# Patient Record
Sex: MALE | Race: WHITE | NOT HISPANIC OR LATINO
[De-identification: names, ages, dates, MRNs, and addresses within clinical notes are randomized per-mention and may not be internally consistent; named-entity substitution may affect disease eponyms.]

---

## 2019-09-26 ENCOUNTER — APPOINTMENT (OUTPATIENT)
Dept: OTOLARYNGOLOGY | Facility: CLINIC | Age: 64
End: 2019-09-26
Payer: COMMERCIAL

## 2019-09-26 VITALS
BODY MASS INDEX: 24.79 KG/M2 | WEIGHT: 183 LBS | DIASTOLIC BLOOD PRESSURE: 80 MMHG | HEIGHT: 72 IN | HEART RATE: 68 BPM | SYSTOLIC BLOOD PRESSURE: 126 MMHG | OXYGEN SATURATION: 98 %

## 2019-09-26 DIAGNOSIS — R68.89 OTHER GENERAL SYMPTOMS AND SIGNS: ICD-10-CM

## 2019-09-26 DIAGNOSIS — Z80.8 FAMILY HISTORY OF MALIGNANT NEOPLASM OF OTHER ORGANS OR SYSTEMS: ICD-10-CM

## 2019-09-26 DIAGNOSIS — K21.9 GASTRO-ESOPHAGEAL REFLUX DISEASE W/OUT ESOPHAGITIS: ICD-10-CM

## 2019-09-26 DIAGNOSIS — Z85.828 PERSONAL HISTORY OF OTHER MALIGNANT NEOPLASM OF SKIN: ICD-10-CM

## 2019-09-26 PROBLEM — Z00.00 ENCOUNTER FOR PREVENTIVE HEALTH EXAMINATION: Status: ACTIVE | Noted: 2019-09-26

## 2019-09-26 PROCEDURE — 99204 OFFICE O/P NEW MOD 45 MIN: CPT | Mod: 25

## 2019-09-26 PROCEDURE — 31575 DIAGNOSTIC LARYNGOSCOPY: CPT

## 2019-09-26 RX ORDER — MULTIVITAMIN
CAPSULE ORAL
Refills: 0 | Status: ACTIVE | COMMUNITY

## 2019-09-26 NOTE — CONSULT LETTER
[Dear  ___] : Dear  [unfilled], [Consult Letter:] : I had the pleasure of evaluating your patient, [unfilled]. [Please see my note below.] : Please see my note below. [Consult Closing:] : Thank you very much for allowing me to participate in the care of this patient.  If you have any questions, please do not hesitate to contact me. [Sincerely,] : Sincerely, [FreeTextEntry2] : Deonte Smith MD [FreeTextEntry3] : Alexander Brooks MD, FACS\par Clinical \par Dept. of Otolaryngology\par [facility]\par  [DrAngel  ___] : Dr. FAGAN

## 2019-09-26 NOTE — PROCEDURE
[Unable to Cooperate with Mirror] : patient unable to cooperate with mirror [Globus] : globus [Topical Lidocaine] : topical lidocaine [Oxymetazoline HCl] : oxymetazoline HCl [Flexible Endoscope] : examined with the flexible endoscope [Serial Number: ___] : Serial Number: [unfilled] [Present] : absent [Normal] : the false vocal folds were pink and regular, the ventricular sulcus was open, the true vocal folds were glistening white, tense and of equal length, mobility, and height [True Vocal Cords Paralysis] : no true vocal cord paralysis [True Vocal Cords Erythematous] : no true vocal cord edema [True Vocal Cords Alarcon's Nodules] : no true vocal cord nodules [Glottis Arytenoid Cartilages] : no arytenoid granulomas [Glottis Arytenoid Cartilages Erythema] : no arytenoid erythema [Interarytenoid Edema] : interarytenoid area edematous

## 2019-09-26 NOTE — HISTORY OF PRESENT ILLNESS
[de-identified] : 65 y/o M with a h/o frequent throat clearing and a globus sensation for the past few months.  Pt also reports a dry throat when he sleeps.  His wife reports that he frequently coughs when he eats.

## 2019-09-26 NOTE — ASSESSMENT
[FreeTextEntry1] : Pt's history and findings are c/w laryngopharyngeal reflux.\par \par Reflux precautions and behavioral modifications were discussed with patient including weight loss, the avoidance of caffeine, alcohol and tobacco ingestion, avoidance of eating within 3 hours of retiring for sleep, and elevation of the head of the bed.\par \par Will defer medications for now.  Pt to consider f/u with his GI MD to discuss as well.  \par

## 2019-09-26 NOTE — REVIEW OF SYSTEMS
[Post Nasal Drip] : post nasal drip [Nasal Congestion] : nasal congestion [Throat Clearing] : throat clearing [Eyes Itch] : itching of the eyes [Cough] : cough [Negative] : Heme/Lymph [Sneezing] : no sneezing [Seasonal Allergies] : no seasonal allergies [Ear Pain] : no ear pain [Ear Itch] : no ear itch [Hearing Loss] : no hearing loss [Vertigo] : no vertigo [Recurrent Ear Infections] : no recurrent ear infections [Ear Drainage] : no ear drainage [Ear Noises] : no ear noises [Lightheadedness] : no lightheadedness [Nose Bleeds] : no nose bleeds [Recurrent Sinus Infections] : no recurrent sinus infections [Problem Snoring] : no snoring problems [Sinus Pain] : no sinus pain [Sinus Pressure] : no sinus pressure [Sense Of Smell Problem] : no sense of smell problem [Discolored Nasal Discharge] : no discolored nasal discharge [Snoring With Pauses] : no snoring with pauses [Hoarseness] : no hoarseness [Throat Pain] : no throat pain [Throat Dryness] : no throat dryness [Throat Itching] : no throat itching [Swelling Neck] : no neck swelling [Swelling Face] : no face swelling [Fever] : no fever [Chills] : no chills [Feeling Poorly] : not feeling poorly [Feeling Tired] : not feeling tired [Recent Weight Gain (___ Lbs)] : no recent weight gain [Recent Weight Loss (___ Lbs)] : no recent weight loss [Eye Pain] : no eye pain [Red  Eyes] : eyes not red [Eyesight Problems] : no eyesight problems [Discharge From Eyes] : no purulent discharge from the eyes [Dry Eyes] : no dryness of the eyes [Heart Rate Is Slow] : the heart rate was not slow [Heart Rate Is Fast] : the heart rate was not fast [Chest Pain] : no chest pain [Palpitations] : no palpitations [Leg Claudication] : no intermittent leg claudication [Lower Ext Edema] : no extremity edema [Shortness Of Breath] : no shortness of breath [Wheezing] : no wheezing [SOB on Exertion] : no shortness of breath during exertion [Orthopnea] : no orthopnea [PND] : no PND [Abdominal Pain] : no abdominal pain [Vomiting] : no vomiting [Constipation] : no constipation [Diarrhea] : no diarrhea [Heartburn] : no heartburn [Melena] : no melena [Dysuria] : no dysuria [Incontinence] : no incontinence [Hesitancy] : no urinary hesitancy [Nocturia] : no nocturia [Genital Lesion] : no genital lesions [Testicular Pain] : no testicular pain [Arthralgias] : no arthralgias [Joint Pain] : no joint pain [Joint Swelling] : no joint swelling [Joint Stiffness] : no joint stiffness [Limb Pain] : no limb pain [Limb Swelling] : no limb swelling [Skin Lesions] : no skin lesions [Skin Wound] : no skin wound [Itching] : no itching [Change In A Mole] : no change in a mole [Dry Skin] : no dry skin [An Unusual Growth] : no unusual growth on the skin [Confused] : no confusion [Convulsions] : no convulsions [Dizziness] : no dizziness [Fainting] : no fainting [Limb Weakness] : no limb weakness [Suicidal] : not suicidal [Difficulty Walking] : no difficulty walking [Sleep Disturbances] : no sleep disturbances [Anxiety] : no anxiety [Depression] : no depression [Change In Personality] : no personality change [Emotional Problems] : no emotional problems [Proptosis] : no proptosis [Hot Flashes] : no hot flashes [Muscle Weakness] : no muscle weakness [Erectile Dysfunction] : no erectile dysfunction [Deepening Of The Voice] : no deepening of the voice [Feelings Of Weakness] : no feelings of weakness [Easy Bleeding] : no tendency for easy bleeding [Easy Bruising] : no tendency for easy bruising [Swollen Glands] : no swollen glands [Swollen Glands In The Neck] : no swollen glands in the neck [FreeTextEntry3] : Watery Eyes

## 2019-11-27 ENCOUNTER — EMERGENCY (EMERGENCY)
Facility: HOSPITAL | Age: 64
LOS: 1 days | Discharge: ANOTHER HOSPITAL | End: 2019-11-27
Attending: EMERGENCY MEDICINE | Admitting: EMERGENCY MEDICINE
Payer: COMMERCIAL

## 2019-11-27 ENCOUNTER — INPATIENT (INPATIENT)
Facility: HOSPITAL | Age: 64
LOS: 1 days | Discharge: HOME | DRG: 301 | End: 2019-11-29
Attending: SURGERY | Admitting: SURGERY
Payer: COMMERCIAL

## 2019-11-27 VITALS
TEMPERATURE: 98 F | SYSTOLIC BLOOD PRESSURE: 151 MMHG | RESPIRATION RATE: 16 BRPM | OXYGEN SATURATION: 97 % | HEART RATE: 74 BPM | DIASTOLIC BLOOD PRESSURE: 75 MMHG

## 2019-11-27 VITALS
TEMPERATURE: 98 F | DIASTOLIC BLOOD PRESSURE: 81 MMHG | RESPIRATION RATE: 16 BRPM | WEIGHT: 179.9 LBS | OXYGEN SATURATION: 98 % | HEART RATE: 101 BPM | HEIGHT: 72 IN | SYSTOLIC BLOOD PRESSURE: 136 MMHG

## 2019-11-27 VITALS
DIASTOLIC BLOOD PRESSURE: 98 MMHG | HEART RATE: 86 BPM | OXYGEN SATURATION: 99 % | TEMPERATURE: 98 F | SYSTOLIC BLOOD PRESSURE: 166 MMHG | RESPIRATION RATE: 20 BRPM

## 2019-11-27 DIAGNOSIS — I77.71 DISSECTION OF CAROTID ARTERY: ICD-10-CM

## 2019-11-27 DIAGNOSIS — Z98.890 OTHER SPECIFIED POSTPROCEDURAL STATES: Chronic | ICD-10-CM

## 2019-11-27 LAB
ALBUMIN SERPL ELPH-MCNC: 3.8 G/DL — SIGNIFICANT CHANGE UP (ref 3.3–5)
ALBUMIN SERPL ELPH-MCNC: 4.5 G/DL — SIGNIFICANT CHANGE UP (ref 3.3–5.2)
ALP SERPL-CCNC: 55 U/L — SIGNIFICANT CHANGE UP (ref 40–120)
ALP SERPL-CCNC: 59 U/L — SIGNIFICANT CHANGE UP (ref 40–120)
ALT FLD-CCNC: 25 U/L — SIGNIFICANT CHANGE UP
ALT FLD-CCNC: 34 U/L — SIGNIFICANT CHANGE UP (ref 12–78)
ANION GAP SERPL CALC-SCNC: 10 MMOL/L — SIGNIFICANT CHANGE UP (ref 5–17)
ANION GAP SERPL CALC-SCNC: 13 MMOL/L — SIGNIFICANT CHANGE UP (ref 5–17)
APTT BLD: 57.9 SEC — HIGH (ref 27.5–36.3)
AST SERPL-CCNC: 19 U/L — SIGNIFICANT CHANGE UP (ref 15–37)
AST SERPL-CCNC: 22 U/L — SIGNIFICANT CHANGE UP
BASOPHILS # BLD AUTO: 0.06 K/UL — SIGNIFICANT CHANGE UP (ref 0–0.2)
BASOPHILS # BLD AUTO: 0.07 K/UL — SIGNIFICANT CHANGE UP (ref 0–0.2)
BASOPHILS NFR BLD AUTO: 0.6 % — SIGNIFICANT CHANGE UP (ref 0–2)
BASOPHILS NFR BLD AUTO: 1 % — SIGNIFICANT CHANGE UP (ref 0–2)
BILIRUB SERPL-MCNC: 0.5 MG/DL — SIGNIFICANT CHANGE UP (ref 0.2–1.2)
BILIRUB SERPL-MCNC: 0.6 MG/DL — SIGNIFICANT CHANGE UP (ref 0.4–2)
BLD GP AB SCN SERPL QL: SIGNIFICANT CHANGE UP
BUN SERPL-MCNC: 11 MG/DL — SIGNIFICANT CHANGE UP (ref 8–20)
BUN SERPL-MCNC: 15 MG/DL — SIGNIFICANT CHANGE UP (ref 7–23)
CALCIUM SERPL-MCNC: 8.7 MG/DL — SIGNIFICANT CHANGE UP (ref 8.5–10.1)
CALCIUM SERPL-MCNC: 9.1 MG/DL — SIGNIFICANT CHANGE UP (ref 8.6–10.2)
CHLORIDE SERPL-SCNC: 104 MMOL/L — SIGNIFICANT CHANGE UP (ref 96–108)
CHLORIDE SERPL-SCNC: 104 MMOL/L — SIGNIFICANT CHANGE UP (ref 98–107)
CO2 SERPL-SCNC: 23 MMOL/L — SIGNIFICANT CHANGE UP (ref 22–31)
CO2 SERPL-SCNC: 24 MMOL/L — SIGNIFICANT CHANGE UP (ref 22–29)
CREAT SERPL-MCNC: 1 MG/DL — SIGNIFICANT CHANGE UP (ref 0.5–1.3)
CREAT SERPL-MCNC: 1.3 MG/DL — SIGNIFICANT CHANGE UP (ref 0.5–1.3)
EOSINOPHIL # BLD AUTO: 0.03 K/UL — SIGNIFICANT CHANGE UP (ref 0–0.5)
EOSINOPHIL # BLD AUTO: 0.06 K/UL — SIGNIFICANT CHANGE UP (ref 0–0.5)
EOSINOPHIL NFR BLD AUTO: 0.4 % — SIGNIFICANT CHANGE UP (ref 0–6)
EOSINOPHIL NFR BLD AUTO: 0.6 % — SIGNIFICANT CHANGE UP (ref 0–6)
GLUCOSE SERPL-MCNC: 103 MG/DL — SIGNIFICANT CHANGE UP (ref 70–115)
GLUCOSE SERPL-MCNC: 235 MG/DL — HIGH (ref 70–99)
HCT VFR BLD CALC: 41.6 % — SIGNIFICANT CHANGE UP (ref 39–50)
HCT VFR BLD CALC: 41.8 % — SIGNIFICANT CHANGE UP (ref 39–50)
HGB BLD-MCNC: 13.8 G/DL — SIGNIFICANT CHANGE UP (ref 13–17)
HGB BLD-MCNC: 14.3 G/DL — SIGNIFICANT CHANGE UP (ref 13–17)
IMM GRANULOCYTES NFR BLD AUTO: 0.3 % — SIGNIFICANT CHANGE UP (ref 0–1.5)
IMM GRANULOCYTES NFR BLD AUTO: 0.3 % — SIGNIFICANT CHANGE UP (ref 0–1.5)
INR BLD: 1.01 RATIO — SIGNIFICANT CHANGE UP (ref 0.88–1.16)
LYMPHOCYTES # BLD AUTO: 2.04 K/UL — SIGNIFICANT CHANGE UP (ref 1–3.3)
LYMPHOCYTES # BLD AUTO: 2.45 K/UL — SIGNIFICANT CHANGE UP (ref 1–3.3)
LYMPHOCYTES # BLD AUTO: 23.5 % — SIGNIFICANT CHANGE UP (ref 13–44)
LYMPHOCYTES # BLD AUTO: 30.3 % — SIGNIFICANT CHANGE UP (ref 13–44)
MCHC RBC-ENTMCNC: 31 PG — SIGNIFICANT CHANGE UP (ref 27–34)
MCHC RBC-ENTMCNC: 31.6 PG — SIGNIFICANT CHANGE UP (ref 27–34)
MCHC RBC-ENTMCNC: 33 GM/DL — SIGNIFICANT CHANGE UP (ref 32–36)
MCHC RBC-ENTMCNC: 34.4 GM/DL — SIGNIFICANT CHANGE UP (ref 32–36)
MCV RBC AUTO: 92 FL — SIGNIFICANT CHANGE UP (ref 80–100)
MCV RBC AUTO: 93.9 FL — SIGNIFICANT CHANGE UP (ref 80–100)
MONOCYTES # BLD AUTO: 0.33 K/UL — SIGNIFICANT CHANGE UP (ref 0–0.9)
MONOCYTES # BLD AUTO: 0.55 K/UL — SIGNIFICANT CHANGE UP (ref 0–0.9)
MONOCYTES NFR BLD AUTO: 4.9 % — SIGNIFICANT CHANGE UP (ref 2–14)
MONOCYTES NFR BLD AUTO: 5.3 % — SIGNIFICANT CHANGE UP (ref 2–14)
NEUTROPHILS # BLD AUTO: 4.25 K/UL — SIGNIFICANT CHANGE UP (ref 1.8–7.4)
NEUTROPHILS # BLD AUTO: 7.29 K/UL — SIGNIFICANT CHANGE UP (ref 1.8–7.4)
NEUTROPHILS NFR BLD AUTO: 63.1 % — SIGNIFICANT CHANGE UP (ref 43–77)
NEUTROPHILS NFR BLD AUTO: 69.7 % — SIGNIFICANT CHANGE UP (ref 43–77)
NRBC # BLD: 0 /100 WBCS — SIGNIFICANT CHANGE UP (ref 0–0)
PLATELET # BLD AUTO: 326 K/UL — SIGNIFICANT CHANGE UP (ref 150–400)
PLATELET # BLD AUTO: 329 K/UL — SIGNIFICANT CHANGE UP (ref 150–400)
POTASSIUM SERPL-MCNC: 3.7 MMOL/L — SIGNIFICANT CHANGE UP (ref 3.5–5.3)
POTASSIUM SERPL-MCNC: 3.8 MMOL/L — SIGNIFICANT CHANGE UP (ref 3.5–5.3)
POTASSIUM SERPL-SCNC: 3.7 MMOL/L — SIGNIFICANT CHANGE UP (ref 3.5–5.3)
POTASSIUM SERPL-SCNC: 3.8 MMOL/L — SIGNIFICANT CHANGE UP (ref 3.5–5.3)
PROT SERPL-MCNC: 7.2 G/DL — SIGNIFICANT CHANGE UP (ref 6.6–8.7)
PROT SERPL-MCNC: 7.3 G/DL — SIGNIFICANT CHANGE UP (ref 6–8.3)
PROTHROM AB SERPL-ACNC: 11.6 SEC — SIGNIFICANT CHANGE UP (ref 10–12.9)
RBC # BLD: 4.45 M/UL — SIGNIFICANT CHANGE UP (ref 4.2–5.8)
RBC # BLD: 4.52 M/UL — SIGNIFICANT CHANGE UP (ref 4.2–5.8)
RBC # FLD: 11.9 % — SIGNIFICANT CHANGE UP (ref 10.3–14.5)
RBC # FLD: 12.2 % — SIGNIFICANT CHANGE UP (ref 10.3–14.5)
SODIUM SERPL-SCNC: 137 MMOL/L — SIGNIFICANT CHANGE UP (ref 135–145)
SODIUM SERPL-SCNC: 141 MMOL/L — SIGNIFICANT CHANGE UP (ref 135–145)
TROPONIN I SERPL-MCNC: <.015 NG/ML — SIGNIFICANT CHANGE UP (ref 0.01–0.04)
WBC # BLD: 10.44 K/UL — SIGNIFICANT CHANGE UP (ref 3.8–10.5)
WBC # BLD: 6.74 K/UL — SIGNIFICANT CHANGE UP (ref 3.8–10.5)
WBC # FLD AUTO: 10.44 K/UL — SIGNIFICANT CHANGE UP (ref 3.8–10.5)
WBC # FLD AUTO: 6.74 K/UL — SIGNIFICANT CHANGE UP (ref 3.8–10.5)

## 2019-11-27 PROCEDURE — 93010 ELECTROCARDIOGRAM REPORT: CPT

## 2019-11-27 PROCEDURE — 99285 EMERGENCY DEPT VISIT HI MDM: CPT

## 2019-11-27 PROCEDURE — 80053 COMPREHEN METABOLIC PANEL: CPT

## 2019-11-27 PROCEDURE — 93880 EXTRACRANIAL BILAT STUDY: CPT | Mod: 26

## 2019-11-27 PROCEDURE — 99285 EMERGENCY DEPT VISIT HI MDM: CPT | Mod: 25

## 2019-11-27 PROCEDURE — 70450 CT HEAD/BRAIN W/O DYE: CPT | Mod: 26,59

## 2019-11-27 PROCEDURE — 70496 CT ANGIOGRAPHY HEAD: CPT | Mod: 26

## 2019-11-27 PROCEDURE — 85027 COMPLETE CBC AUTOMATED: CPT

## 2019-11-27 PROCEDURE — 70496 CT ANGIOGRAPHY HEAD: CPT

## 2019-11-27 PROCEDURE — 84484 ASSAY OF TROPONIN QUANT: CPT

## 2019-11-27 PROCEDURE — 36415 COLL VENOUS BLD VENIPUNCTURE: CPT

## 2019-11-27 PROCEDURE — 70498 CT ANGIOGRAPHY NECK: CPT

## 2019-11-27 PROCEDURE — 70498 CT ANGIOGRAPHY NECK: CPT | Mod: 26

## 2019-11-27 PROCEDURE — 70450 CT HEAD/BRAIN W/O DYE: CPT

## 2019-11-27 RX ORDER — LISINOPRIL 2.5 MG/1
1 TABLET ORAL
Qty: 0 | Refills: 0 | DISCHARGE

## 2019-11-27 RX ORDER — SODIUM CHLORIDE 9 MG/ML
1000 INJECTION INTRAMUSCULAR; INTRAVENOUS; SUBCUTANEOUS ONCE
Refills: 0 | Status: COMPLETED | OUTPATIENT
Start: 2019-11-27 | End: 2019-11-27

## 2019-11-27 RX ORDER — HEPARIN SODIUM 5000 [USP'U]/ML
5000 INJECTION INTRAVENOUS; SUBCUTANEOUS ONCE
Refills: 0 | Status: COMPLETED | OUTPATIENT
Start: 2019-11-27 | End: 2019-11-27

## 2019-11-27 RX ORDER — LABETALOL HCL 100 MG
5 TABLET ORAL ONCE
Refills: 0 | Status: COMPLETED | OUTPATIENT
Start: 2019-11-27 | End: 2019-11-27

## 2019-11-27 RX ORDER — HEPARIN SODIUM 5000 [USP'U]/ML
INJECTION INTRAVENOUS; SUBCUTANEOUS
Qty: 25000 | Refills: 0 | Status: DISCONTINUED | OUTPATIENT
Start: 2019-11-27 | End: 2019-11-29

## 2019-11-27 RX ORDER — HEPARIN SODIUM 5000 [USP'U]/ML
INJECTION INTRAVENOUS; SUBCUTANEOUS
Qty: 25000 | Refills: 0 | Status: DISCONTINUED | OUTPATIENT
Start: 2019-11-27 | End: 2019-11-27

## 2019-11-27 RX ORDER — LISINOPRIL 2.5 MG/1
10 TABLET ORAL DAILY
Refills: 0 | Status: DISCONTINUED | OUTPATIENT
Start: 2019-11-27 | End: 2019-11-29

## 2019-11-27 RX ADMIN — SODIUM CHLORIDE 1000 MILLILITER(S): 9 INJECTION INTRAMUSCULAR; INTRAVENOUS; SUBCUTANEOUS at 13:15

## 2019-11-27 RX ADMIN — LISINOPRIL 10 MILLIGRAM(S): 2.5 TABLET ORAL at 22:19

## 2019-11-27 RX ADMIN — HEPARIN SODIUM 5000 UNIT(S): 5000 INJECTION INTRAVENOUS; SUBCUTANEOUS at 18:25

## 2019-11-27 RX ADMIN — SODIUM CHLORIDE 1000 MILLILITER(S): 9 INJECTION INTRAMUSCULAR; INTRAVENOUS; SUBCUTANEOUS at 14:20

## 2019-11-27 RX ADMIN — Medication 5 MILLIGRAM(S): at 20:50

## 2019-11-27 RX ADMIN — HEPARIN SODIUM 1500 UNIT(S)/HR: 5000 INJECTION INTRAVENOUS; SUBCUTANEOUS at 22:23

## 2019-11-27 NOTE — H&P ADULT - ASSESSMENT
63yo male with right internal carotid dissection.   - Admit to Vascualr under Dr. Biggs  - Heparin gtt  - Carotid Duplex  - Reg diet

## 2019-11-27 NOTE — H&P ADULT - NSICDXPASTSURGICALHX_GEN_ALL_CORE_FT
PAST SURGICAL HISTORY:  H/O local excision of skin lesion for basal and squamous cell carcinomas of skin

## 2019-11-27 NOTE — ED PROVIDER NOTE - ATTENDING CONTRIBUTION TO CARE
Pt is a 65 yo male who presents to the ED with a cc of intermittent lightheadedness since 11/17.  He reports that on that day he had been experiencing some nausea and abdominal discomfort.  He had a large loose bowel movement that day x 1 with resolution of symptoms.  Pt reports that following day he felt better and he thought that he was improving.  Early last week he went back to the gym.  He then reports that while at the gym and lifting on Thursday he again developed lightheadedness and felt like his left arm was weak.  This episode lasted approx 2-3 min and then resolved.  During this he noted a pulsing sensation in the back of his head.  he became concerned and followed up with his cardiologist on Friday and had blood work preformed.  He was told that his blood pressure was elevated and was started on Lisinopril.  Pt also reports that he was experiencing ear discomfort.  At that visit he was told that he had dry ears and was prescribed steroid ear drops.  Since Friday he reports that the episodes of lightheadedness have increased in frequency and now occur at rest.  He followed up for ECHO today and upon returning home he reports he became lightheaded with weakness and tingling in bilateral UE.  He also notes that he was hyperventilating at this time.  Denies fever, chills, N/V, CP, abd pain.  Pt also reports that earlier this week he noted a floater in his left eye not currently present.  At this time pt denies all complaints.  Denies fever, chills, N/V, CP, SOB, abd pain, ext numbness or weakness at this time.  Will obtain screening labs, EKG, orthostatics, CT head, CTA head/neck.  Will monitor pt Pt is a 65 yo male who presents to the ED with a cc of intermittent lightheadedness since 11/17.  He reports that on that day he had been experiencing some nausea and abdominal discomfort.  He had a large loose bowel movement that day x 1 with resolution of symptoms.  Pt reports that following day he felt better and he thought that he was improving.  Early last week he went back to the gym.  He then reports that while at the gym and lifting on Thursday he again developed lightheadedness and felt like his left arm was weak.  This episode lasted approx 2-3 min and then resolved.  During this he noted a pulsing sensation in the back of his head.  he became concerned and followed up with his cardiologist on Friday and had blood work preformed.  He was told that his blood pressure was elevated and was started on Lisinopril.  Pt also reports that he was experiencing ear discomfort.  At that visit he was told that he had dry ears and was prescribed steroid ear drops.  Since Friday he reports that the episodes of lightheadedness have increased in frequency and now occur at rest.  He followed up for ECHO today and upon returning home he reports he became lightheaded with weakness and tingling in bilateral UE.  He also notes that he was hyperventilating at this time.  Denies fever, chills, N/V, CP, abd pain.  Pt also reports that earlier this week he noted a floater in his left eye not currently present.  At this time pt denies all complaints.  Denies fever, chills, N/V, CP, SOB, abd pain, ext numbness or weakness at this time. On exam pt lying in bed NAD, NCAT, PERRL, EOMI, heart RRR, lungs CTA, abd soft NT/ND.  CN II-XII intact with no focal deficits at this time.  Pt is ambulatory with no ataxia.  Will obtain screening labs, EKG, orthostatics, CT head, CTA head/neck.  Will monitor pt.  Agree with above plan of care Pt is a 63 yo male who presents to the ED with a cc of intermittent lightheadedness since 11/17.  He reports that on that day he had been experiencing some nausea and abdominal discomfort.  He had a large loose bowel movement that day x 1 with resolution of symptoms.  Pt reports that following day he felt better and he thought that he was improving.  Early last week he went back to the gym.  He then reports that while at the gym and lifting on Thursday he again developed lightheadedness and felt like his left arm was weak.  This episode lasted approx 2-3 min and then resolved.  During this he noted a pulsing sensation in the back of his head.  he became concerned and followed up with his cardiologist on Friday and had blood work preformed.  He was told that his blood pressure was elevated and was started on Lisinopril.  Pt also reports that he was experiencing ear discomfort.  At that visit he was told that he had dry ears and was prescribed steroid ear drops.  Since Friday he reports that the episodes of lightheadedness have increased in frequency and now occur at rest.  He followed up for ECHO today and upon returning home he reports he became lightheaded with weakness and tingling in bilateral UE.  He also notes that he was hyperventilating at this time.  Denies fever, chills, N/V, CP, abd pain.  Pt also reports that earlier this week he noted a floater in his left eye not currently present.  At this time pt denies all complaints.  Denies fever, chills, N/V, CP, SOB, abd pain, ext numbness or weakness at this time. On exam pt lying in bed NAD, NCAT, PERRL, EOMI, heart RRR, lungs CTA, abd soft NT/ND.  CN II-XII intact with no focal deficits at this time.  Pt is ambulatory with no ataxia.  Will obtain screening labs, EKG, orthostatics, CT head, CTA head/neck.  Will monitor pt.  Agree with above plan of care  Of note pt is not a candidate for TPA as NIH stroke scale is currently 0 and pt is outside of window  Currently NPO at this time

## 2019-11-27 NOTE — ED PROVIDER NOTE - OBJECTIVE STATEMENT
64 year old male with no reported Hx presenting to the ED c/o dizziness that started this morning. Patient states he went to [place] this morning and they did a CT scan showing a carotid dissection with plaque. Pt also reports having high blood pressure recently. Pt also reports he had dizziness 6 days ago after working out t the gym. Denies any LOC. Pt denies fevers/chills, ha, loc, focal neuro deficits, cp/sob/palp, cough, abd pain/n/v/d, urinary symptoms, recent travel and sick contacts. 64 year old male with no reported Hx presenting to the ED c/o dizziness that started this morning. Patient states he went to [place] this morning and they did a CT scan showing a carotid dissection with plaque. As per patient, was given 5000 units of Heparin at 5:41pm. Pt also reports having high blood pressure recently. Pt also reports he had dizziness 6 days ago after working out at the gym. Denies any LOC. Pt denies fevers/chills, ha, loc, focal neuro deficits, cp/sob/palp, cough, abd pain/n/v/d, urinary symptoms, recent travel and sick contacts. 64 year old male with no reported Hx presenting to the ED c/o dizziness. Patient states he went to [place] this morning and they did a CT scan showing a carotid dissection. As per patient, was given 5000 units of Heparin at 5:41pm. Pt also reports having high blood pressure recently. Pt also reports he had dizziness - lightheaded not room spinning - 6 days ago after working out at the gym. Denies any LOC. Pt denies fevers/chills, ha, loc, focal neuro deficits, cp/sob/palp, cough, abd pain/n/v/d, urinary symptoms, recent travel and sick contacts.

## 2019-11-27 NOTE — ED ADULT NURSE NOTE - NSIMPLEMENTINTERV_GEN_ALL_ED
Implemented All Universal Safety Interventions:  [place] to call system. Call bell, personal items and telephone within reach. Instruct patient to call for assistance. Room bathroom lighting operational. Non-slip footwear when patient is off stretcher. Physically safe environment: no spills, clutter or unnecessary equipment. Stretcher in lowest position, wheels locked, appropriate side rails in place. Implemented All Fall Risk Interventions:  [place] to call system. Call bell, personal items and telephone within reach. Instruct patient to call for assistance. Room bathroom lighting operational. Non-slip footwear when patient is off stretcher. Physically safe environment: no spills, clutter or unnecessary equipment. Stretcher in lowest position, wheels locked, appropriate side rails in place. Provide visual cue, wrist band, yellow gown, etc. Monitor gait and stability. Monitor for mental status changes and reorient to person, place, and time. Review medications for side effects contributing to fall risk. Reinforce activity limits and safety measures with patient and family.

## 2019-11-27 NOTE — ED PROVIDER NOTE - OBJECTIVE STATEMENT
Pt is a 63 yo female with pmhx of htn recently placed on Lisinopril c/o of intermittent dizziness described as lightheadedness for one week. Pt states today he was sitting became lightheaded blurry vision and became anxious with tingling in both and hand feet.  Pt states that last week shortly after getting over a "virus" he went to the gym and began to feel dizzy.  Pt was subsequently seen by MD, had normal blood work and today had cardiac echo performed.  Pt due to have head CT.  Pt denies chest or back pain, denies N/ falls abdominal pain dysuria fever neck pain. Pt also on steroid drops for ears due to dry ears.

## 2019-11-27 NOTE — H&P ADULT - NSHPPHYSICALEXAM_GEN_ALL_CORE
PHYSICAL EXAM:  GENERAL: NAD, lying in bed comfortably  EYES: EOMI, PERRLA  CHEST/LUNG: Clear to auscultation bilaterally  HEART: Regular rate and rhythm  ABDOMEN: Bowel sounds present; Soft, Nontender, Nondistended. No hepatomegally  EXTREMITIES:  2+ Peripheral Pulses, brisk capillary refill  NERVOUS SYSTEM:  Alert & Oriented X3, motor and sensory intact, CN's intact, strength 5/5 throughout  MSK: FROM all 4 extremities, full and equal strength  VASCULAR: +2 central and distal pulses

## 2019-11-27 NOTE — ED ADULT NURSE NOTE - ED STAT RN HANDOFF DETAILS
Report given to Eli Bro RN. Plan of care explained. Verbalized understanding. Patient transported to ultrasound. Will return to CDU 2L.

## 2019-11-27 NOTE — ED PROVIDER NOTE - PROGRESS NOTE DETAILS
labs wnl ct head neck wnl cta + possible right internal carotid dissection transfer to [place] initiated Dr. Biggs advised heparin will transfer accepting er dr. Landon

## 2019-11-27 NOTE — ED PROVIDER NOTE - CLINICAL SUMMARY MEDICAL DECISION MAKING FREE TEXT BOX
64 year old male with no reported Hx presenting to the ED c/o dizziness that started this morning. 64 year old male with no reported Hx presenting to the ED c/o dizziness pt transferred from [place] for vascular surgery - admit to vascular surgery for further management

## 2019-11-27 NOTE — H&P ADULT - HISTORY OF PRESENT ILLNESS
63yo male transfer from [facility] due to findings of R internal carotid artery dissection. Patient initially presented complaining of lightheaded and dizziness for the past week, also experienced while working out very mild LUE and LLE weakness. Currently denies chest pain, SOB, nausea, vomiting, fevers or chills.

## 2019-11-27 NOTE — ED ADULT NURSE NOTE - OBJECTIVE STATEMENT
Patient is a 64 year old male, A&Ox3 in no apparent distress. c/o medical evaluation for carotid dissection. Patient states today he started to feel dizzy and wobbly. Wife brought patient to [facility]. Got a CT done that showed a right carotid dissection with plaque. Patient states he started to feel dizzy on Thursday after working out. Patient states "I just felt unstable on my feet". Denies LOC, pain, N/V. Patient states he has had diarrhea for a week and a half. No cardiac history. Patient states "it just feels tight in my chest". Patient placed on cardiac monitor. Respirations even, spontaneous, and unlabored. MD Clayton at bedside. Family at bedside.

## 2019-11-27 NOTE — ED ADULT NURSE NOTE - OBJECTIVE STATEMENT
Pt A&Ox4, ambulatory to ED c/o dizziness.  Pt is extremely anxious, states his hands are "tingling."  Pt states that last week shortly after getting over a "virus" he went to the gym and began to feel dizzy.  Pt was subsequently seen by MD, had "normal" bloodwork and today had cardiac echo performed.  Pt due to have head CT (not yet scheduled).  Today, pt states he began feeling very dizzy again with "weakness and tingling" in his hands and arms.  Pt denies chest or back pain, denies N/V.

## 2019-11-27 NOTE — ED ADULT NURSE NOTE - CHPI ED NUR SYMPTOMS NEG
no congestion/no shortness of breath/no nausea/no syncope/no vomiting/no diaphoresis/no fever/no back pain/no chest pain/no chills

## 2019-11-28 LAB
ANION GAP SERPL CALC-SCNC: 12 MMOL/L — SIGNIFICANT CHANGE UP (ref 5–17)
APTT BLD: 60.6 SEC — HIGH (ref 27.5–36.3)
APTT BLD: 65.7 SEC — HIGH (ref 27.5–36.3)
BASOPHILS # BLD AUTO: 0.07 K/UL — SIGNIFICANT CHANGE UP (ref 0–0.2)
BASOPHILS NFR BLD AUTO: 0.8 % — SIGNIFICANT CHANGE UP (ref 0–2)
BUN SERPL-MCNC: 9 MG/DL — SIGNIFICANT CHANGE UP (ref 8–20)
CALCIUM SERPL-MCNC: 8.9 MG/DL — SIGNIFICANT CHANGE UP (ref 8.6–10.2)
CHLORIDE SERPL-SCNC: 103 MMOL/L — SIGNIFICANT CHANGE UP (ref 98–107)
CO2 SERPL-SCNC: 24 MMOL/L — SIGNIFICANT CHANGE UP (ref 22–29)
CREAT SERPL-MCNC: 0.88 MG/DL — SIGNIFICANT CHANGE UP (ref 0.5–1.3)
EOSINOPHIL # BLD AUTO: 0.09 K/UL — SIGNIFICANT CHANGE UP (ref 0–0.5)
EOSINOPHIL NFR BLD AUTO: 1.1 % — SIGNIFICANT CHANGE UP (ref 0–6)
GLUCOSE SERPL-MCNC: 99 MG/DL — SIGNIFICANT CHANGE UP (ref 70–115)
HCT VFR BLD CALC: 40.7 % — SIGNIFICANT CHANGE UP (ref 39–50)
HCV AB S/CO SERPL IA: 0.17 S/CO — SIGNIFICANT CHANGE UP (ref 0–0.99)
HCV AB SERPL-IMP: SIGNIFICANT CHANGE UP
HGB BLD-MCNC: 13.7 G/DL — SIGNIFICANT CHANGE UP (ref 13–17)
IMM GRANULOCYTES NFR BLD AUTO: 0.4 % — SIGNIFICANT CHANGE UP (ref 0–1.5)
INR BLD: 1.03 RATIO — SIGNIFICANT CHANGE UP (ref 0.88–1.16)
LYMPHOCYTES # BLD AUTO: 2.42 K/UL — SIGNIFICANT CHANGE UP (ref 1–3.3)
LYMPHOCYTES # BLD AUTO: 28.9 % — SIGNIFICANT CHANGE UP (ref 13–44)
MAGNESIUM SERPL-MCNC: 2.2 MG/DL — SIGNIFICANT CHANGE UP (ref 1.6–2.6)
MCHC RBC-ENTMCNC: 31.6 PG — SIGNIFICANT CHANGE UP (ref 27–34)
MCHC RBC-ENTMCNC: 33.7 GM/DL — SIGNIFICANT CHANGE UP (ref 32–36)
MCV RBC AUTO: 94 FL — SIGNIFICANT CHANGE UP (ref 80–100)
MONOCYTES # BLD AUTO: 0.52 K/UL — SIGNIFICANT CHANGE UP (ref 0–0.9)
MONOCYTES NFR BLD AUTO: 6.2 % — SIGNIFICANT CHANGE UP (ref 2–14)
NEUTROPHILS # BLD AUTO: 5.23 K/UL — SIGNIFICANT CHANGE UP (ref 1.8–7.4)
NEUTROPHILS NFR BLD AUTO: 62.6 % — SIGNIFICANT CHANGE UP (ref 43–77)
PHOSPHATE SERPL-MCNC: 4 MG/DL — SIGNIFICANT CHANGE UP (ref 2.4–4.7)
PLATELET # BLD AUTO: 281 K/UL — SIGNIFICANT CHANGE UP (ref 150–400)
POTASSIUM SERPL-MCNC: 4.1 MMOL/L — SIGNIFICANT CHANGE UP (ref 3.5–5.3)
POTASSIUM SERPL-SCNC: 4.1 MMOL/L — SIGNIFICANT CHANGE UP (ref 3.5–5.3)
PROTHROM AB SERPL-ACNC: 11.9 SEC — SIGNIFICANT CHANGE UP (ref 10–12.9)
RBC # BLD: 4.33 M/UL — SIGNIFICANT CHANGE UP (ref 4.2–5.8)
RBC # FLD: 11.9 % — SIGNIFICANT CHANGE UP (ref 10.3–14.5)
SODIUM SERPL-SCNC: 139 MMOL/L — SIGNIFICANT CHANGE UP (ref 135–145)
WBC # BLD: 8.36 K/UL — SIGNIFICANT CHANGE UP (ref 3.8–10.5)
WBC # FLD AUTO: 8.36 K/UL — SIGNIFICANT CHANGE UP (ref 3.8–10.5)

## 2019-11-28 RX ORDER — ACETAMINOPHEN 500 MG
650 TABLET ORAL EVERY 6 HOURS
Refills: 0 | Status: DISCONTINUED | OUTPATIENT
Start: 2019-11-28 | End: 2019-11-29

## 2019-11-28 RX ADMIN — Medication 650 MILLIGRAM(S): at 21:19

## 2019-11-28 RX ADMIN — Medication 650 MILLIGRAM(S): at 22:15

## 2019-11-28 RX ADMIN — HEPARIN SODIUM 1500 UNIT(S)/HR: 5000 INJECTION INTRAVENOUS; SUBCUTANEOUS at 14:59

## 2019-11-28 RX ADMIN — HEPARIN SODIUM 1500 UNIT(S)/HR: 5000 INJECTION INTRAVENOUS; SUBCUTANEOUS at 05:49

## 2019-11-29 ENCOUNTER — TRANSCRIPTION ENCOUNTER (OUTPATIENT)
Age: 64
End: 2019-11-29

## 2019-11-29 VITALS — SYSTOLIC BLOOD PRESSURE: 123 MMHG | HEART RATE: 64 BPM | DIASTOLIC BLOOD PRESSURE: 80 MMHG | TEMPERATURE: 97 F

## 2019-11-29 LAB
APTT BLD: 78.5 SEC — HIGH (ref 27.5–36.3)
HCT VFR BLD CALC: 41.7 % — SIGNIFICANT CHANGE UP (ref 39–50)
HGB BLD-MCNC: 14.2 G/DL — SIGNIFICANT CHANGE UP (ref 13–17)
MCHC RBC-ENTMCNC: 31.9 PG — SIGNIFICANT CHANGE UP (ref 27–34)
MCHC RBC-ENTMCNC: 34.1 GM/DL — SIGNIFICANT CHANGE UP (ref 32–36)
MCV RBC AUTO: 93.7 FL — SIGNIFICANT CHANGE UP (ref 80–100)
PLATELET # BLD AUTO: 268 K/UL — SIGNIFICANT CHANGE UP (ref 150–400)
RBC # BLD: 4.45 M/UL — SIGNIFICANT CHANGE UP (ref 4.2–5.8)
RBC # FLD: 11.8 % — SIGNIFICANT CHANGE UP (ref 10.3–14.5)
WBC # BLD: 8.04 K/UL — SIGNIFICANT CHANGE UP (ref 3.8–10.5)
WBC # FLD AUTO: 8.04 K/UL — SIGNIFICANT CHANGE UP (ref 3.8–10.5)

## 2019-11-29 PROCEDURE — 86803 HEPATITIS C AB TEST: CPT

## 2019-11-29 PROCEDURE — 85730 THROMBOPLASTIN TIME PARTIAL: CPT

## 2019-11-29 PROCEDURE — 80053 COMPREHEN METABOLIC PANEL: CPT

## 2019-11-29 PROCEDURE — 96374 THER/PROPH/DIAG INJ IV PUSH: CPT

## 2019-11-29 PROCEDURE — 83735 ASSAY OF MAGNESIUM: CPT

## 2019-11-29 PROCEDURE — 99285 EMERGENCY DEPT VISIT HI MDM: CPT | Mod: 25

## 2019-11-29 PROCEDURE — 93880 EXTRACRANIAL BILAT STUDY: CPT

## 2019-11-29 PROCEDURE — 86901 BLOOD TYPING SEROLOGIC RH(D): CPT

## 2019-11-29 PROCEDURE — 84100 ASSAY OF PHOSPHORUS: CPT

## 2019-11-29 PROCEDURE — 86850 RBC ANTIBODY SCREEN: CPT

## 2019-11-29 PROCEDURE — 80048 BASIC METABOLIC PNL TOTAL CA: CPT

## 2019-11-29 PROCEDURE — 85610 PROTHROMBIN TIME: CPT

## 2019-11-29 PROCEDURE — 36415 COLL VENOUS BLD VENIPUNCTURE: CPT

## 2019-11-29 PROCEDURE — 93005 ELECTROCARDIOGRAM TRACING: CPT

## 2019-11-29 PROCEDURE — 85027 COMPLETE CBC AUTOMATED: CPT

## 2019-11-29 PROCEDURE — 86900 BLOOD TYPING SEROLOGIC ABO: CPT

## 2019-11-29 RX ORDER — APIXABAN 2.5 MG/1
5 TABLET, FILM COATED ORAL
Refills: 0 | Status: DISCONTINUED | OUTPATIENT
Start: 2019-11-29 | End: 2019-11-29

## 2019-11-29 RX ORDER — LISINOPRIL 2.5 MG/1
1 TABLET ORAL
Qty: 0 | Refills: 0 | DISCHARGE

## 2019-11-29 RX ORDER — RIVAROXABAN 15 MG-20MG
20 KIT ORAL DAILY
Refills: 0 | Status: DISCONTINUED | OUTPATIENT
Start: 2019-11-29 | End: 2019-11-29

## 2019-11-29 RX ORDER — APIXABAN 2.5 MG/1
1 TABLET, FILM COATED ORAL
Qty: 60 | Refills: 0
Start: 2019-11-29

## 2019-11-29 RX ORDER — RIVAROXABAN 15 MG-20MG
1 KIT ORAL
Qty: 30 | Refills: 0
Start: 2019-11-29 | End: 2019-12-28

## 2019-11-29 RX ORDER — ACETAMINOPHEN 500 MG
2 TABLET ORAL
Qty: 0 | Refills: 0 | DISCHARGE
Start: 2019-11-29

## 2019-11-29 RX ADMIN — LISINOPRIL 10 MILLIGRAM(S): 2.5 TABLET ORAL at 06:37

## 2019-11-29 RX ADMIN — APIXABAN 5 MILLIGRAM(S): 2.5 TABLET, FILM COATED ORAL at 14:08

## 2019-11-29 RX ADMIN — HEPARIN SODIUM 1500 UNIT(S)/HR: 5000 INJECTION INTRAVENOUS; SUBCUTANEOUS at 06:38

## 2019-11-29 NOTE — DISCHARGE NOTE NURSING/CASE MANAGEMENT/SOCIAL WORK - PATIENT PORTAL LINK FT
You can access the FollowMyHealth Patient Portal offered by [facility] by registering at the following website: [email or web address]. By joining [facility]’s FollowMyHealth portal, you will also be able to view your health information using other applications (apps) compatible with our system.

## 2019-11-29 NOTE — DISCHARGE NOTE PROVIDER - NSDCCPCAREPLAN_GEN_ALL_CORE_FT
PRINCIPAL DISCHARGE DIAGNOSIS  Diagnosis: Carotid artery dissection  Assessment and Plan of Treatment:       SECONDARY DISCHARGE DIAGNOSES  Diagnosis: HLD (hyperlipidemia)  Assessment and Plan of Treatment:     Diagnosis: HTN (hypertension)  Assessment and Plan of Treatment:

## 2019-11-29 NOTE — DISCHARGE NOTE PROVIDER - NSDCMRMEDTOKEN_GEN_ALL_CORE_FT
acetaminophen 325 mg oral tablet: 2 tab(s) orally every 6 hours, As needed, Mild Pain (1 - 3)  lisinopril 10 mg oral tablet: 1 tab(s) orally once a day  rivaroxaban 20 mg oral tablet: 1 tab(s) orally once a day (in the evening) acetaminophen 325 mg oral tablet: 2 tab(s) orally every 6 hours, As needed, Mild Pain (1 - 3)  apixaban 5 mg oral tablet: 1 tab(s) orally 2 times a day  lisinopril 10 mg oral tablet: 1 tab(s) orally once a day

## 2019-11-29 NOTE — DISCHARGE NOTE PROVIDER - HOSPITAL COURSE
HPI:    63yo male transfer from [facility] due to findings of R internal carotid artery dissection. Patient initially presented complaining of lightheaded and dizziness for the past week, also experienced while working out very mild LUE and LLE weakness. CTA head and neck revealed no acute intracranial hemorrhage or acute territorial infarct and a dissection of the proximal right internal carotid artery at the level of a complex ulcerative plaque that is approximately 3 cm cephalad to the bifurcation. The lumen is diffusely     narrowed in the mid and upper neck but there is no evidence of occlusion. Intracranially, the intracerebral vasculature is widely patent. In general, there is tortuosity of the head and neck vasculature. Both carotid bifurcations are widely patent. Vertebral arteries are bilaterally patent. The pt was admitted to vascular surgery service and started on Heparin gtt. He had no further symptoms and was transitioned to oral anticoagulants and is stable for dc home.

## 2019-11-29 NOTE — DISCHARGE NOTE PROVIDER - NSDCACTIVITY_GEN_ALL_CORE
Walking - Indoors allowed/Walking - Outdoors allowed/No heavy lifting/straining/Sex allowed/Showering allowed/Stairs allowed

## 2019-11-29 NOTE — PROGRESS NOTE ADULT - SUBJECTIVE AND OBJECTIVE BOX
Patient is a 64y old  Male who presents with a chief complaint of R internal carotid artery dissection (27 Nov 2019 21:23)    No acute events overnight.  Heparin drip therapeutic.  Tolerating diet, urinating and ambulating independently.  Patient denies lightheadedness, dizziness, changes in vision, weakness/sensory changes in extremities, aphasia, dysarthria.    Denies f/c/n/v.  Denies chest pain, dyspnea.     heparin  Infusion.   lisinopril 10    Allergies    Allergy Status Unknown  No Known Allergies    Intolerances        Vital Signs Last 24 Hrs  T(C): 36.6 (29 Nov 2019 06:33), Max: 36.8 (28 Nov 2019 16:01)  T(F): 97.9 (29 Nov 2019 06:33), Max: 98.3 (28 Nov 2019 16:01)  HR: 57 (29 Nov 2019 06:33) (57 - 80)  BP: 140/70 (29 Nov 2019 06:33) (117/70 - 152/83)  BP(mean): --  RR: 17 (29 Nov 2019 06:33) (17 - 18)  SpO2: 96% (29 Nov 2019 06:33) (95% - 98%)  I&O's Detail      Physical Exam:  General: NAD, resting comfortably in bed  Neck: 2+ carotid pulses bilaterally, no bruits auscultated  Pulmonary: Nonlabored breathing, no respiratory distress  Cardiovascular: NSR  Abdominal: soft, NT/ND  Extremities: WWP  Pulses:   2+ carotid and radial pulses bilaterally      LABS:                        14.2   8.04  )-----------( 268      ( 29 Nov 2019 05:59 )             41.7     11-28    139  |  103  |  9.0  ----------------------------<  99  4.1   |  24.0  |  0.88    Ca    8.9      28 Nov 2019 05:33  Phos  4.0     11-28  Mg     2.2     11-28    TPro  7.2  /  Alb  4.5  /  TBili  0.6  /  DBili  x   /  AST  22  /  ALT  25  /  AlkPhos  55  11-27    PT/INR - ( 28 Nov 2019 05:33 )   PT: 11.9 sec;   INR: 1.03 ratio         PTT - ( 29 Nov 2019 05:59 )  PTT:78.5 sec  CAPILLARY BLOOD GLUCOSE        Radiology and Additional Studies:    Assessment and Plan: 64yMaleCarotid artery dissection    HLD (hyperlipidemia)  Hypertension  HTN (hypertension)  No pertinent past medical history  H/O local excision of skin lesion  No significant past surgical history  No significant past surgical history      Discussed condition at length with patient, including need for long term anticoagulation and limitation of exercise for 6-8weeks until repeat imaging.    Patient requested second opinion, case will be reviewed by Dr. Mendenhall.    Continue heparin drip, will review insurance coverage to select appropriate NOAC agent.    Likely discharge to home on NOAC tonight vs tomorrow    continue DASH diet, OOB, IS, home lisinopril

## 2019-11-29 NOTE — DISCHARGE NOTE PROVIDER - CARE PROVIDER_API CALL
ManvarSingh, Pallavi B (MD)  Vascular Surgery  [address], [address]st [place] [address]  Phone: [phone]  Fax: [phone]  Follow Up Time: 2 weeks

## 2019-12-06 PROBLEM — E78.5 HYPERLIPIDEMIA, UNSPECIFIED: Chronic | Status: ACTIVE | Noted: 2019-11-27

## 2019-12-06 PROBLEM — I10 ESSENTIAL (PRIMARY) HYPERTENSION: Chronic | Status: ACTIVE | Noted: 2019-11-27

## 2019-12-09 ENCOUNTER — APPOINTMENT (OUTPATIENT)
Dept: OTOLARYNGOLOGY | Facility: CLINIC | Age: 64
End: 2019-12-09
Payer: COMMERCIAL

## 2019-12-09 VITALS
HEART RATE: 84 BPM | HEIGHT: 72 IN | OXYGEN SATURATION: 98 % | BODY MASS INDEX: 24.11 KG/M2 | SYSTOLIC BLOOD PRESSURE: 122 MMHG | DIASTOLIC BLOOD PRESSURE: 78 MMHG | WEIGHT: 178 LBS | TEMPERATURE: 98.3 F

## 2019-12-09 PROCEDURE — 99213 OFFICE O/P EST LOW 20 MIN: CPT

## 2019-12-09 NOTE — HISTORY OF PRESENT ILLNESS
[de-identified] : Pt here for f/u with new problem.  He flew in October and his ear became plugged up overnight.  His ear cleared by the next day.   Pt also reports a dizzy episode in mid November.  He went to [facility].  He was diagnosed with a R carotid dissection.  He is in the process of dealing with this issue.  Last week he began having the muffled sound in the L ear again.

## 2019-12-09 NOTE — CONSULT LETTER
[Dear  ___] : Dear  [unfilled], [Courtesy Letter:] : I had the pleasure of seeing your patient, [unfilled], in my office today. [Please see my note below.] : Please see my note below. [Consult Closing:] : Thank you very much for allowing me to participate in the care of this patient.  If you have any questions, please do not hesitate to contact me. [FreeTextEntry2] : Deonte Smith MD [Sincerely,] : Sincerely, [FreeTextEntry3] : Alexander Brooks MD, FACS\par Clinical \par Dept. of Otolaryngology and Head & Neck Surgery\par [facility]\par

## 2019-12-31 ENCOUNTER — RESULT REVIEW (OUTPATIENT)
Age: 64
End: 2019-12-31

## 2020-01-10 ENCOUNTER — RESULT REVIEW (OUTPATIENT)
Age: 65
End: 2020-01-10

## 2020-01-13 ENCOUNTER — MESSAGE (OUTPATIENT)
Age: 65
End: 2020-01-13

## 2020-02-11 ENCOUNTER — APPOINTMENT (OUTPATIENT)
Dept: VASCULAR SURGERY | Facility: CLINIC | Age: 65
End: 2020-02-11
Payer: COMMERCIAL

## 2020-02-11 VITALS
BODY MASS INDEX: 24.11 KG/M2 | WEIGHT: 178 LBS | HEIGHT: 72 IN | DIASTOLIC BLOOD PRESSURE: 87 MMHG | TEMPERATURE: 97.8 F | HEART RATE: 58 BPM | SYSTOLIC BLOOD PRESSURE: 132 MMHG | OXYGEN SATURATION: 99 %

## 2020-02-11 DIAGNOSIS — Z82.49 FAMILY HISTORY OF ISCHEMIC HEART DISEASE AND OTHER DISEASES OF THE CIRCULATORY SYSTEM: ICD-10-CM

## 2020-02-11 DIAGNOSIS — Z80.6 FAMILY HISTORY OF LEUKEMIA: ICD-10-CM

## 2020-02-11 PROCEDURE — 93880 EXTRACRANIAL BILAT STUDY: CPT

## 2020-02-11 PROCEDURE — 99214 OFFICE O/P EST MOD 30 MIN: CPT

## 2020-02-11 RX ORDER — SIMVASTATIN 20 MG/1
20 TABLET, FILM COATED ORAL
Qty: 30 | Refills: 3 | Status: ACTIVE | COMMUNITY
Start: 2020-02-11 | End: 1900-01-01

## 2020-02-11 RX ORDER — LISINOPRIL 10 MG/1
10 TABLET ORAL
Refills: 0 | Status: DISCONTINUED | COMMUNITY
End: 2020-02-11

## 2020-02-11 RX ORDER — APIXABAN 5 MG/1
TABLET, FILM COATED ORAL
Refills: 0 | Status: DISCONTINUED | COMMUNITY
End: 2020-02-11

## 2020-02-17 PROBLEM — Z82.49 FAMILY HISTORY OF HYPERTENSION: Status: ACTIVE | Noted: 2020-02-11

## 2020-02-17 PROBLEM — Z80.6 FAMILY HISTORY OF LEUKEMIA: Status: ACTIVE | Noted: 2020-02-11

## 2020-02-17 RX ORDER — ASPIRIN 81 MG
81 TABLET,CHEWABLE ORAL
Refills: 0 | Status: ACTIVE | COMMUNITY

## 2020-02-17 RX ORDER — ROSUVASTATIN CALCIUM 5 MG/1
5 TABLET, FILM COATED ORAL
Refills: 0 | Status: DISCONTINUED | COMMUNITY
End: 2020-02-17

## 2020-02-19 ENCOUNTER — APPOINTMENT (OUTPATIENT)
Dept: OTOLARYNGOLOGY | Facility: CLINIC | Age: 65
End: 2020-02-19

## 2020-02-26 ENCOUNTER — APPOINTMENT (OUTPATIENT)
Dept: OTOLARYNGOLOGY | Facility: CLINIC | Age: 65
End: 2020-02-26
Payer: COMMERCIAL

## 2020-02-26 PROCEDURE — 92557 COMPREHENSIVE HEARING TEST: CPT

## 2020-02-26 PROCEDURE — 99214 OFFICE O/P EST MOD 30 MIN: CPT | Mod: 25

## 2020-02-26 PROCEDURE — 92567 TYMPANOMETRY: CPT

## 2020-03-02 ENCOUNTER — INPATIENT (INPATIENT)
Facility: HOSPITAL | Age: 65
LOS: 0 days | Discharge: HOME | DRG: 390 | End: 2020-03-03
Attending: SPECIALIST | Admitting: SPECIALIST
Payer: COMMERCIAL

## 2020-03-02 VITALS
OXYGEN SATURATION: 98 % | DIASTOLIC BLOOD PRESSURE: 88 MMHG | SYSTOLIC BLOOD PRESSURE: 143 MMHG | HEART RATE: 85 BPM | TEMPERATURE: 97 F | WEIGHT: 171.08 LBS | HEIGHT: 71 IN | RESPIRATION RATE: 20 BRPM

## 2020-03-02 DIAGNOSIS — Z98.890 OTHER SPECIFIED POSTPROCEDURAL STATES: Chronic | ICD-10-CM

## 2020-03-02 DIAGNOSIS — R10.9 UNSPECIFIED ABDOMINAL PAIN: ICD-10-CM

## 2020-03-02 DIAGNOSIS — Q43.3 CONGENITAL MALFORMATIONS OF INTESTINAL FIXATION: ICD-10-CM

## 2020-03-02 LAB
ALBUMIN SERPL ELPH-MCNC: 4.2 G/DL — SIGNIFICANT CHANGE UP (ref 3.3–5)
ALP SERPL-CCNC: 60 U/L — SIGNIFICANT CHANGE UP (ref 40–120)
ALT FLD-CCNC: 35 U/L — SIGNIFICANT CHANGE UP (ref 12–78)
ANION GAP SERPL CALC-SCNC: 7 MMOL/L — SIGNIFICANT CHANGE UP (ref 5–17)
AST SERPL-CCNC: 26 U/L — SIGNIFICANT CHANGE UP (ref 15–37)
BASOPHILS # BLD AUTO: 0.06 K/UL — SIGNIFICANT CHANGE UP (ref 0–0.2)
BASOPHILS NFR BLD AUTO: 0.5 % — SIGNIFICANT CHANGE UP (ref 0–2)
BILIRUB SERPL-MCNC: 0.5 MG/DL — SIGNIFICANT CHANGE UP (ref 0.2–1.2)
BLD GP AB SCN SERPL QL: SIGNIFICANT CHANGE UP
BUN SERPL-MCNC: 21 MG/DL — SIGNIFICANT CHANGE UP (ref 7–23)
CALCIUM SERPL-MCNC: 9.5 MG/DL — SIGNIFICANT CHANGE UP (ref 8.5–10.1)
CHLORIDE SERPL-SCNC: 101 MMOL/L — SIGNIFICANT CHANGE UP (ref 96–108)
CO2 SERPL-SCNC: 31 MMOL/L — SIGNIFICANT CHANGE UP (ref 22–31)
CREAT SERPL-MCNC: 1.2 MG/DL — SIGNIFICANT CHANGE UP (ref 0.5–1.3)
EOSINOPHIL # BLD AUTO: 0.07 K/UL — SIGNIFICANT CHANGE UP (ref 0–0.5)
EOSINOPHIL NFR BLD AUTO: 0.6 % — SIGNIFICANT CHANGE UP (ref 0–6)
GLUCOSE SERPL-MCNC: 121 MG/DL — HIGH (ref 70–99)
HCT VFR BLD CALC: 41.3 % — SIGNIFICANT CHANGE UP (ref 39–50)
HGB BLD-MCNC: 14.3 G/DL — SIGNIFICANT CHANGE UP (ref 13–17)
IMM GRANULOCYTES NFR BLD AUTO: 0.5 % — SIGNIFICANT CHANGE UP (ref 0–1.5)
LACTATE SERPL-SCNC: 1.6 MMOL/L — SIGNIFICANT CHANGE UP (ref 0.7–2)
LIDOCAIN IGE QN: 281 U/L — SIGNIFICANT CHANGE UP (ref 73–393)
LYMPHOCYTES # BLD AUTO: 19.8 % — SIGNIFICANT CHANGE UP (ref 13–44)
LYMPHOCYTES # BLD AUTO: 2.32 K/UL — SIGNIFICANT CHANGE UP (ref 1–3.3)
MCHC RBC-ENTMCNC: 31.5 PG — SIGNIFICANT CHANGE UP (ref 27–34)
MCHC RBC-ENTMCNC: 34.6 GM/DL — SIGNIFICANT CHANGE UP (ref 32–36)
MCV RBC AUTO: 91 FL — SIGNIFICANT CHANGE UP (ref 80–100)
MONOCYTES # BLD AUTO: 0.64 K/UL — SIGNIFICANT CHANGE UP (ref 0–0.9)
MONOCYTES NFR BLD AUTO: 5.5 % — SIGNIFICANT CHANGE UP (ref 2–14)
NEUTROPHILS # BLD AUTO: 8.58 K/UL — HIGH (ref 1.8–7.4)
NEUTROPHILS NFR BLD AUTO: 73.1 % — SIGNIFICANT CHANGE UP (ref 43–77)
NRBC # BLD: 0 /100 WBCS — SIGNIFICANT CHANGE UP (ref 0–0)
PLATELET # BLD AUTO: 318 K/UL — SIGNIFICANT CHANGE UP (ref 150–400)
POTASSIUM SERPL-MCNC: 3.9 MMOL/L — SIGNIFICANT CHANGE UP (ref 3.5–5.3)
POTASSIUM SERPL-SCNC: 3.9 MMOL/L — SIGNIFICANT CHANGE UP (ref 3.5–5.3)
PROT SERPL-MCNC: 8.1 G/DL — SIGNIFICANT CHANGE UP (ref 6–8.3)
RBC # BLD: 4.54 M/UL — SIGNIFICANT CHANGE UP (ref 4.2–5.8)
RBC # FLD: 12.6 % — SIGNIFICANT CHANGE UP (ref 10.3–14.5)
SODIUM SERPL-SCNC: 139 MMOL/L — SIGNIFICANT CHANGE UP (ref 135–145)
TROPONIN I SERPL-MCNC: <.015 NG/ML — SIGNIFICANT CHANGE UP (ref 0.01–0.04)
WBC # BLD: 11.73 K/UL — HIGH (ref 3.8–10.5)
WBC # FLD AUTO: 11.73 K/UL — HIGH (ref 3.8–10.5)

## 2020-03-02 PROCEDURE — 71045 X-RAY EXAM CHEST 1 VIEW: CPT | Mod: 26

## 2020-03-02 PROCEDURE — 93010 ELECTROCARDIOGRAM REPORT: CPT

## 2020-03-02 PROCEDURE — 99284 EMERGENCY DEPT VISIT MOD MDM: CPT

## 2020-03-02 PROCEDURE — 74177 CT ABD & PELVIS W/CONTRAST: CPT | Mod: 26

## 2020-03-02 PROCEDURE — 74176 CT ABD & PELVIS W/O CONTRAST: CPT | Mod: 26,59

## 2020-03-02 RX ORDER — FAMOTIDINE 10 MG/ML
20 INJECTION INTRAVENOUS ONCE
Refills: 0 | Status: COMPLETED | OUTPATIENT
Start: 2020-03-02 | End: 2020-03-02

## 2020-03-02 RX ORDER — SODIUM CHLORIDE 9 MG/ML
1000 INJECTION INTRAMUSCULAR; INTRAVENOUS; SUBCUTANEOUS ONCE
Refills: 0 | Status: COMPLETED | OUTPATIENT
Start: 2020-03-02 | End: 2020-03-02

## 2020-03-02 RX ORDER — SODIUM CHLORIDE 9 MG/ML
2400 INJECTION INTRAMUSCULAR; INTRAVENOUS; SUBCUTANEOUS ONCE
Refills: 0 | Status: COMPLETED | OUTPATIENT
Start: 2020-03-02 | End: 2020-03-02

## 2020-03-02 RX ORDER — MORPHINE SULFATE 50 MG/1
4 CAPSULE, EXTENDED RELEASE ORAL ONCE
Refills: 0 | Status: DISCONTINUED | OUTPATIENT
Start: 2020-03-02 | End: 2020-03-02

## 2020-03-02 RX ORDER — ONDANSETRON 8 MG/1
4 TABLET, FILM COATED ORAL ONCE
Refills: 0 | Status: COMPLETED | OUTPATIENT
Start: 2020-03-02 | End: 2020-03-02

## 2020-03-02 RX ADMIN — SODIUM CHLORIDE 2400 MILLILITER(S): 9 INJECTION INTRAMUSCULAR; INTRAVENOUS; SUBCUTANEOUS at 20:12

## 2020-03-02 RX ADMIN — FAMOTIDINE 20 MILLIGRAM(S): 10 INJECTION INTRAVENOUS at 20:12

## 2020-03-02 NOTE — ED PROVIDER NOTE - OBJECTIVE STATEMENT
pt is a 65 yo male whose pmd is dr arcos not a smoker or drinker no drugs no allergies hx of carotid artery dissection treated medically no longer on blood thinners not complaint with asa hx of htn.  he was well in [facility] until 5 pm tonight when he developed suddeon set of severe abd pain to front of abd. the pain is intense and diffuse not radiating  no gu sx no flank or back pain + nausea no fever no hx of same

## 2020-03-02 NOTE — ED PROVIDER NOTE - CARE PLAN
Principal Discharge DX:	Abdominal pain Principal Discharge DX:	Abdominal pain  Secondary Diagnosis:	Midgut volvulus

## 2020-03-02 NOTE — H&P ADULT - PROBLEM SELECTOR PLAN 1
repeat CT now.  If findings persist will need laparotomy.  If he remains asymptomatic will not do surgery in middle of night

## 2020-03-02 NOTE — H&P ADULT - NSICDXPASTMEDICALHX_GEN_ALL_CORE_FT
PAST MEDICAL HISTORY:  History of carotid artery dissection 3 months ago    HLD (hyperlipidemia)     HTN (hypertension)     Hypertension

## 2020-03-02 NOTE — H&P ADULT - NSICDXPASTSURGICALHX_GEN_ALL_CORE_FT
PAST SURGICAL HISTORY:  H/O local excision of skin lesion for basal and squamous cell carcinomas of skin    No significant past surgical history

## 2020-03-02 NOTE — H&P ADULT - ASSESSMENT
Complete resolution of distention, pain and tenderness along with passage of flatus suggest small bowel volvulus may have untwisted

## 2020-03-02 NOTE — ED ADULT NURSE NOTE - OBJECTIVE STATEMENT
patient came in ED from home with c/o sudden onset of lower abdominal pain 7-8/10 at this time X 18:00H. patient noted pacing, stating he feels gassy. patient noted anxious. alert and oriented X 4. afebrile. denies nausea and vomiting. denies diarrhea

## 2020-03-02 NOTE — ED PROVIDER NOTE - CLINICAL SUMMARY MEDICAL DECISION MAKING FREE TEXT BOX
ro ischemic ro obstructive ro infectius causes of severe abd pain on exam  ct labs pain mgt surgical contultation

## 2020-03-02 NOTE — ED PROVIDER NOTE - PROGRESS NOTE DETAILS
pain better off to ct rcvd call from radiology pt has midgut volvulus surgery called pt and family aware

## 2020-03-02 NOTE — ED ADULT NURSE NOTE - CHPI ED NUR SYMPTOMS NEG
no abdominal distension/no burning urination/no chills/no blood in stool/no diarrhea/no vomiting/no fever/no dysuria/no hematuria

## 2020-03-02 NOTE — ED ADULT NURSE NOTE - PSH
H/O local excision of skin lesion  for basal and squamous cell carcinomas of skin  No significant past surgical history

## 2020-03-02 NOTE — H&P ADULT - NSHPLABSRESULTS_GEN_ALL_CORE
WBC 11.7  lactate 1.6  CT: small bowel volvulus with swirling of mesentery at the mesenteric root,  mesenteric edema and massive distention of stomach

## 2020-03-02 NOTE — H&P ADULT - HISTORY OF PRESENT ILLNESS
Pt is a 64 year old man who had sudden onset at 5:30 PM of severe abdominal discomfort "like gas" with prominent bloating. No nausea.  In ER Dr Nayak noted severe pain and abdominal tenderness.  Pt now has no discomfort and says bloating is gone.  He has passed flatus in ER and feels well.  No similar prior illness. No nausea

## 2020-03-02 NOTE — ED ADULT NURSE NOTE - NSIMPLEMENTINTERV_GEN_ALL_ED
Implemented All Universal Safety Interventions:  [place] to call system. Call bell, personal items and telephone within reach. Instruct patient to call for assistance. Room bathroom lighting operational. Non-slip footwear when patient is off stretcher. Physically safe environment: no spills, clutter or unnecessary equipment. Stretcher in lowest position, wheels locked, appropriate side rails in place.

## 2020-03-02 NOTE — ED ADULT NURSE NOTE - ED STAT RN HANDOFF DETAILS
Assumed care of pt from previous nurse, Lissette PATEL in rm 15B a/o x4, anxious, no respiratory distress noted, even and unlabored breathing, abd soft BS+ all 4 quads, + distention, skin warm and d ry, + sensation, + capillary refill < 2 sec. IV access noted on the right a/c patent/ intact, flushes with ease. Pt denies pain at this time.

## 2020-03-02 NOTE — ED PROVIDER NOTE - CHPI ED SYMPTOMS NEG
no chills/no blood in stool/no dysuria/no hematuria/no diarrhea/no burning urination/no abdominal distension/no fever/no vomiting

## 2020-03-03 ENCOUNTER — TRANSCRIPTION ENCOUNTER (OUTPATIENT)
Age: 65
End: 2020-03-03

## 2020-03-03 VITALS
SYSTOLIC BLOOD PRESSURE: 136 MMHG | RESPIRATION RATE: 17 BRPM | OXYGEN SATURATION: 99 % | HEART RATE: 67 BPM | TEMPERATURE: 97 F | DIASTOLIC BLOOD PRESSURE: 94 MMHG

## 2020-03-03 LAB
HCT VFR BLD CALC: 35.6 % — LOW (ref 39–50)
HGB BLD-MCNC: 12 G/DL — LOW (ref 13–17)
MCHC RBC-ENTMCNC: 31.1 PG — SIGNIFICANT CHANGE UP (ref 27–34)
MCHC RBC-ENTMCNC: 33.7 GM/DL — SIGNIFICANT CHANGE UP (ref 32–36)
MCV RBC AUTO: 92.2 FL — SIGNIFICANT CHANGE UP (ref 80–100)
NRBC # BLD: 0 /100 WBCS — SIGNIFICANT CHANGE UP (ref 0–0)
PLATELET # BLD AUTO: 246 K/UL — SIGNIFICANT CHANGE UP (ref 150–400)
RBC # BLD: 3.86 M/UL — LOW (ref 4.2–5.8)
RBC # FLD: 12.8 % — SIGNIFICANT CHANGE UP (ref 10.3–14.5)
WBC # BLD: 8.4 K/UL — SIGNIFICANT CHANGE UP (ref 3.8–10.5)
WBC # FLD AUTO: 8.4 K/UL — SIGNIFICANT CHANGE UP (ref 3.8–10.5)

## 2020-03-03 PROCEDURE — 74019 RADEX ABDOMEN 2 VIEWS: CPT | Mod: 26

## 2020-03-03 PROCEDURE — 93005 ELECTROCARDIOGRAM TRACING: CPT

## 2020-03-03 PROCEDURE — 83690 ASSAY OF LIPASE: CPT

## 2020-03-03 PROCEDURE — 99285 EMERGENCY DEPT VISIT HI MDM: CPT | Mod: 25

## 2020-03-03 PROCEDURE — 99238 HOSP IP/OBS DSCHRG MGMT 30/<: CPT

## 2020-03-03 PROCEDURE — 86900 BLOOD TYPING SEROLOGIC ABO: CPT

## 2020-03-03 PROCEDURE — 84484 ASSAY OF TROPONIN QUANT: CPT

## 2020-03-03 PROCEDURE — 74176 CT ABD & PELVIS W/O CONTRAST: CPT

## 2020-03-03 PROCEDURE — 74019 RADEX ABDOMEN 2 VIEWS: CPT

## 2020-03-03 PROCEDURE — 80053 COMPREHEN METABOLIC PANEL: CPT

## 2020-03-03 PROCEDURE — 74177 CT ABD & PELVIS W/CONTRAST: CPT

## 2020-03-03 PROCEDURE — 86850 RBC ANTIBODY SCREEN: CPT

## 2020-03-03 PROCEDURE — 83605 ASSAY OF LACTIC ACID: CPT

## 2020-03-03 PROCEDURE — 96374 THER/PROPH/DIAG INJ IV PUSH: CPT | Mod: XU

## 2020-03-03 PROCEDURE — 85027 COMPLETE CBC AUTOMATED: CPT

## 2020-03-03 PROCEDURE — 36415 COLL VENOUS BLD VENIPUNCTURE: CPT

## 2020-03-03 PROCEDURE — 71045 X-RAY EXAM CHEST 1 VIEW: CPT

## 2020-03-03 PROCEDURE — 86901 BLOOD TYPING SEROLOGIC RH(D): CPT

## 2020-03-03 RX ORDER — LISINOPRIL 2.5 MG/1
5 TABLET ORAL DAILY
Refills: 0 | Status: DISCONTINUED | OUTPATIENT
Start: 2020-03-03 | End: 2020-03-03

## 2020-03-03 RX ORDER — LISINOPRIL 2.5 MG/1
5 TABLET ORAL ONCE
Refills: 0 | Status: COMPLETED | OUTPATIENT
Start: 2020-03-03 | End: 2020-03-03

## 2020-03-03 RX ORDER — LISINOPRIL 2.5 MG/1
5 TABLET ORAL AT BEDTIME
Refills: 0 | Status: DISCONTINUED | OUTPATIENT
Start: 2020-03-03 | End: 2020-03-03

## 2020-03-03 RX ORDER — SODIUM CHLORIDE 9 MG/ML
1000 INJECTION INTRAMUSCULAR; INTRAVENOUS; SUBCUTANEOUS
Refills: 0 | Status: DISCONTINUED | OUTPATIENT
Start: 2020-03-03 | End: 2020-03-03

## 2020-03-03 RX ADMIN — SODIUM CHLORIDE 60 MILLILITER(S): 9 INJECTION INTRAMUSCULAR; INTRAVENOUS; SUBCUTANEOUS at 03:54

## 2020-03-03 RX ADMIN — LISINOPRIL 5 MILLIGRAM(S): 2.5 TABLET ORAL at 02:14

## 2020-03-03 NOTE — DISCHARGE NOTE PROVIDER - CARE PROVIDER_API CALL
Lewis Nettles)  [employer] [facility]  [address], [address]  [place] [address]  Phone: [phone]  Fax: [phone]  Follow Up Time:

## 2020-03-03 NOTE — PROGRESS NOTE ADULT - SUBJECTIVE AND OBJECTIVE BOX
Afeb VS wnl  Feels entirely well with no pain and good appetite.  Moved bowels and passing flatus  Abd: nontender, nondistended  AXR: stomach not visibly distended, normal xray  WBC down to8  P: discharge; told to call me and return to ER if symptoms return

## 2020-03-03 NOTE — DISCHARGE NOTE PROVIDER - NSDCMRMEDTOKEN_GEN_ALL_CORE_FT
acetaminophen 325 mg oral tablet: 2 tab(s) orally every 6 hours, As needed, Mild Pain (1 - 3)  lisinopril 10 mg oral tablet: 1 tab(s) orally once a day

## 2020-03-03 NOTE — DISCHARGE NOTE PROVIDER - HOSPITAL COURSE
Pt is a 64 year old man presented to [place] ED with complaint of abdominal discomfort with discomfort "like gas" with prominent bloating. No nausea. Pt was seen by PMD and was tender on exam. Pt had no discomfort upon arrival to ED and stated bloating had resolved.  He has passed flatus in ER and felt well.  No similar prior illness. No nausea. Pt underwent CT Abdomen which revealed "Massively distended stomach and duodenum related to a mid gut volvulus. There is swirling of the superior mesenteric artery and superior mesenteric vein, which lies anterior and to the left of the artery. Mildly thickened small bowel in the lower abdomen may be reactive/early ischemic. Central mesenteric edema. The cecum lies in the right lower quadrant without evidence of intestinal malrotation. No free air, pneumatosis, or portal venous gas at this time. The findings were discussed with Dr. Nayak." Pt seen by Dr. Nettles- Pt had leukocytosis with WBC 11.7 lactate 1.6 and complete resolution of symptoms and advised to undergo repeat    Repeat CT Abd/Pelvis which revealed- "Motion artifact limits this study. Moderate gaseous distension of the stomach up to level of the duodenum, similar to prior study. --Possible outlet obstruction or gastroparesis are considerations. No evidence of small bowel obstruction. Mild - moderate amount retained stool material throughout nondilated colon.   Complete resolution of distention, pain and tenderness along with passage of flatus suggest small bowel volvulus may have untwisted. Pt was admitted for observation and continued to have BM, pass flatus and was advanced to regular diet with evidence of benign abdominal exam and radiologic findings. After diet tolerance, patient was stable for discharge.                             < end of copied text > Pt is a 64 year old man presented to [place] ED with complaint of abdominal discomfort with discomfort "like gas" with prominent bloating. No nausea. Pt was seen by PMD and was tender on exam. Pt had no discomfort upon arrival to ED and stated bloating had resolved.  He has passed flatus in ER and felt well.  No similar prior illness. No nausea. Pt underwent CT Abdomen which revealed "Massively distended stomach and duodenum related to a mid gut volvulus. There is swirling of the superior mesenteric artery and superior mesenteric vein, which lies anterior and to the left of the artery. Mildly thickened small bowel in the lower abdomen may be reactive/early ischemic. Central mesenteric edema. The cecum lies in the right lower quadrant without evidence of intestinal malrotation. No free air, pneumatosis, or portal venous gas at this time. The findings were discussed with Dr. Nayak." Pt seen by Dr. Nettles- Pt had leukocytosis with WBC 11.7 lactate 1.6 and complete resolution of symptoms on exam and advised to undergo repeat    Repeat CT Abd/Pelvis which revealed- "Motion artifact limits this study. Moderate gaseous distension of the stomach up to level of the duodenum, similar to prior study. --Possible outlet obstruction or gastroparesis are considerations. No evidence of small bowel obstruction. Mild - moderate amount retained stool material throughout nondilated colon. Pt was admitted for observation. On Hospital day #1, pt had complete resolution of distention, pain and tenderness along with passage of flatus suggestive of resolved small bowel volvulus and felt  volvulus may have untwisted. Pt pass flatus, have BM and was advanced to regular diet with evidence of benign abdominal exam and radiologic findings. After diet tolerance, patient was stable for discharge.

## 2020-03-03 NOTE — PROGRESS NOTE ADULT - SUBJECTIVE AND OBJECTIVE BOX
SUBJECTIVE:  Patient seen and examined at bedside.  No overnight events.  Patient with no new complaints at this time, states he feels "back to normal," +passing flatus, 1 BM yesterday morning.  Denies fever, chest pain, shortness of breath, abdominal pain, nausea or vomiting.    Vital Signs Last 24 Hrs  T(C): 36.4 (03 Mar 2020 03:46), Max: 36.4 (02 Mar 2020 22:13)  T(F): 97.5 (03 Mar 2020 03:46), Max: 97.6 (02 Mar 2020 22:13)  HR: 76 (03 Mar 2020 03:46) (68 - 85)  BP: 108/67 (03 Mar 2020 03:46) (108/67 - 168/90)  RR: 17 (03 Mar 2020 03:46) (16 - 20)  SpO2: 100% (03 Mar 2020 03:46) (97% - 100%)    PHYSICAL EXAM  GENERAL:  Well-nourished, well-developed male lying comfortably in bed in NAD  HEAD:  Normocephalic, atraumatic  CARDIO:  Regular rate and rhythm.  No murmur, gallop or rub appreciated.  RESPIRATORY:  Clear to auscultation bilaterally.  No wheezing, rales or rhonchi appreciated.  ABDOMEN:  Soft, nondistended, nontender; BS appreciated on auscultation.  No rebound tenderness or guarding.  NEURO:  A&O x 3    MEDICATIONS  (STANDING):  lisinopril 5 milliGRAM(s) Oral at bedtime  sodium chloride 0.9%. 1000 milliLiter(s) (60 mL/Hr) IV Continuous <Continuous>    LABS:                        14.3   11.73 )-----------( 318      ( 02 Mar 2020 20:31 )             41.3     03-02    139  |  101  |  21  ----------------------------<  121<H>  3.9   |  31  |  1.20    Ca    9.5      02 Mar 2020 20:31    TPro  8.1  /  Alb  4.2  /  TBili  0.5  /  DBili  x   /  AST  26  /  ALT  35  /  AlkPhos  60  03-02    RADIOLOGY & ADDITIONAL STUDIES:  < from: CT Abdomen and Pelvis No Cont (03.02.20 @ 23:53) >  IMPRESSION:   1. Motion artifact limits this study.   2. Moderate gaseous distension of the stomach up to level of the duodenum,   similar to prior study. --Possible outlet obstruction or gastroparesisare   considerations.   3. No evidence of small bowel obstruction.   4. Mild - moderate amount retained stool material throughout nondilated colon.  ******PRELIMINARY REPORT******    ASSESSMENT & PLAN  64 year old male with midgut volvulus, resolved.  Also with distended stomach on CT    - Follow up AM AXR  - Serial abdominal exams  - OOB  - Case to be discussed with Dr. Nettles

## 2020-03-03 NOTE — DISCHARGE NOTE NURSING/CASE MANAGEMENT/SOCIAL WORK - PATIENT PORTAL LINK FT
You can access the FollowMyHealth Patient Portal offered by [facility] by registering at the following website: [email or web address]. By joining [facility]’s FollowMyHealth portal, you will also be able to view your health information using other applications (apps) compatible with our system.

## 2020-03-03 NOTE — DISCHARGE NOTE PROVIDER - NSDCACTIVITY_GEN_ALL_CORE
Do not drive or operate machinery/No restrictions/Walking - Outdoors allowed/Driving allowed/Showering allowed/Stairs allowed/Walking - Indoors allowed/Do not make important decisions

## 2020-03-03 NOTE — DISCHARGE NOTE PROVIDER - NSDCCPCAREPLAN_GEN_ALL_CORE_FT
PRINCIPAL DISCHARGE DIAGNOSIS  Diagnosis: Midgut volvulus  Assessment and Plan of Treatment:       SECONDARY DISCHARGE DIAGNOSES  Diagnosis: Midgut volvulus  Assessment and Plan of Treatment:

## 2020-03-08 NOTE — DATA REVIEWED
[de-identified] : Ad: WNL with the exception of a mild to moderate SNHL cookiebite 1.5-2kHz\par As: WNL with the exception of a slight SNHL cookiebite at 2kHz\par Tinnitus pitch matched to 6kHz pure tone at 27dBnHL,  As reference. stable asymmetric sensorineural hearing loss  [de-identified] : MRI reviewed - radu - Mandie normal - report confirms

## 2020-03-08 NOTE — REASON FOR VISIT
[Initial Evaluation] : an initial evaluation for [FreeTextEntry2] : renato. for hearing loss- referred by Dr. Brooks

## 2020-03-08 NOTE — HISTORY OF PRESENT ILLNESS
[de-identified] : 64M here for eval. for hearing loss- referred by Dr. Brooks. Pt states he flew in October and his LEFT ear became plugged up overnight- ear cleared by the next day. Pt also reports a dizzy episode in mid November. He went to [facility]- diagnosed with a R carotid dissection- placed on BP meds and anticoagulants. Pt notes that the first week of Dec- had a high pitched sound in the left ear- unable to tell where sound was coming from- work up at 5am the next day with tinnitus and hearing back to baseline. Pt states he had increased pressure in both- since beginning of December- notes hearing loss- had audiogram on 12/9/2019 and 1/30/2020. Pt denies h/o of chronic ear infections or ear surgeries- pt had HL in family- mom later in life- wears HA. Pt had CT Brain while in hospital and MRI (as ordered by Dr. Brooks- done at [facility])- was normal MRI of brain and IACS. Pt recovering from bronchitis- on ZPack. Also had autophony in January. Also was on steorids but caused worsening ear pressure - stopped - prior to October no previous episodes.

## 2020-03-10 ENCOUNTER — APPOINTMENT (OUTPATIENT)
Dept: OTOLARYNGOLOGY | Facility: CLINIC | Age: 65
End: 2020-03-10
Payer: COMMERCIAL

## 2020-03-10 PROCEDURE — 92584 ELECTROCOCHLEOGRAPHY: CPT

## 2020-03-13 PROBLEM — Z86.79 PERSONAL HISTORY OF OTHER DISEASES OF THE CIRCULATORY SYSTEM: Chronic | Status: ACTIVE | Noted: 2020-03-02

## 2020-04-27 ENCOUNTER — APPOINTMENT (OUTPATIENT)
Dept: OTOLARYNGOLOGY | Facility: CLINIC | Age: 65
End: 2020-04-27
Payer: COMMERCIAL

## 2020-04-27 VITALS — DIASTOLIC BLOOD PRESSURE: 90 MMHG | SYSTOLIC BLOOD PRESSURE: 152 MMHG | TEMPERATURE: 97.7 F | HEART RATE: 66 BPM

## 2020-04-27 PROCEDURE — 99214 OFFICE O/P EST MOD 30 MIN: CPT | Mod: 25

## 2020-04-27 PROCEDURE — 36415 COLL VENOUS BLD VENIPUNCTURE: CPT

## 2020-04-27 PROCEDURE — 92567 TYMPANOMETRY: CPT

## 2020-04-27 PROCEDURE — 92557 COMPREHENSIVE HEARING TEST: CPT

## 2020-04-27 PROCEDURE — 92584 ELECTROCOCHLEOGRAPHY: CPT

## 2020-04-29 NOTE — HISTORY OF PRESENT ILLNESS
[de-identified] : 64M here for f/u for asymmetric SNHL and tinnitus. Pt did not want to pursue HAs- ECoG positive for right Meniere's- pt started on Dyazide (with Lisinopril- clearance obtained from Dr. Deonte Smith ). Pt reports pressure is better but is having a burning sensation- with congestion- tried Benadryl and Flonase (no help)- stopped Dyazide x 2 days (felt it was causing burning sensation)- didn't help when stopping. Pt feels hearing is unchanged- no vertigo. No benefit from Benadryl - hearing fluctuates  - does not correlate with burning -

## 2020-04-29 NOTE — DATA REVIEWED
[de-identified] : LE: Hearing WNL.\par RE: Hearing essentially WNL with the exception of a mild HL at 1.5 KHz and a mild SNHL at 2 KHz.\par \par EcochG:\par  LE: 52% and RE: 62%

## 2020-04-29 NOTE — REASON FOR VISIT
[Subsequent Evaluation] : a subsequent evaluation for [Ear Pain] : ear pain [Hearing Loss] : hearing loss

## 2020-05-01 LAB
CRP SERPL HS-MCNC: <0.15 MG/L
CRP SERPL-MCNC: <0.1 MG/DL
ERYTHROCYTE [SEDIMENTATION RATE] IN BLOOD BY WESTERGREN METHOD: 5 MM/HR

## 2020-05-05 DIAGNOSIS — H91.92 UNSPECIFIED HEARING LOSS, LEFT EAR: ICD-10-CM

## 2020-05-05 LAB
B BURGDOR AB SER-IMP: NEGATIVE
B BURGDOR IGM PATRN SER IB-IMP: NEGATIVE
B BURGDOR18KD IGG SER QL IB: NORMAL
B BURGDOR23KD IGG SER QL IB: NORMAL
B BURGDOR23KD IGM SER QL IB: NORMAL
B BURGDOR28KD IGG SER QL IB: NORMAL
B BURGDOR30KD IGG SER QL IB: PRESENT
B BURGDOR31KD IGG SER QL IB: PRESENT
B BURGDOR39KD IGG SER QL IB: NORMAL
B BURGDOR39KD IGM SER QL IB: NORMAL
B BURGDOR41KD IGG SER QL IB: PRESENT
B BURGDOR41KD IGM SER QL IB: PRESENT
B BURGDOR45KD IGG SER QL IB: NORMAL
B BURGDOR58KD IGG SER QL IB: PRESENT
B BURGDOR66KD IGG SER QL IB: NORMAL
B BURGDOR93KD IGG SER QL IB: NORMAL

## 2020-05-18 NOTE — PRE-OP CHECKLIST - WEIGHT IN LBS
HPI:    Patient ID: Kriss Parada is a 78year old male.     HPI  Comes in for follow-up overall is doing okay except he continues to be short of breath echo normal stress test normal patient with advanced renal disease and anemia but both been stabl, fol chronic kidney disease, without long-term current use of insulin ([facility] [address] 1/30/2020      No past surgical history on file. No family history on file.    Social History: Social History    Tobacco Use      Smoking status: Never Smoker      Smokeless tobacco: N continue with current treatment    Orders Placed This Encounter      CBC W Differential W Platelet [E] Specimen      Hemoglobin A1C [E]      Basic Metabolic Panel (8) [E]      Meds This Visit:  Requested Prescriptions     Signed Prescriptions Disp Refills 171

## 2020-06-02 NOTE — REASON FOR VISIT
[Subsequent Evaluation] : a subsequent evaluation for [Hearing Loss] : hearing loss [Tinnitus] : tinnitus

## 2020-06-03 ENCOUNTER — APPOINTMENT (OUTPATIENT)
Dept: OTOLARYNGOLOGY | Facility: CLINIC | Age: 65
End: 2020-06-03
Payer: COMMERCIAL

## 2020-06-03 VITALS — HEART RATE: 77 BPM | DIASTOLIC BLOOD PRESSURE: 81 MMHG | SYSTOLIC BLOOD PRESSURE: 126 MMHG

## 2020-06-03 PROCEDURE — 99213 OFFICE O/P EST LOW 20 MIN: CPT

## 2020-06-03 RX ORDER — MONTELUKAST 10 MG/1
10 TABLET, FILM COATED ORAL DAILY
Qty: 90 | Refills: 3 | Status: ACTIVE | COMMUNITY
Start: 2020-06-03 | End: 1900-01-01

## 2020-06-03 NOTE — HISTORY OF PRESENT ILLNESS
[de-identified] : 64M here for f/u for asymmetric SNHL and Right Meniere's- pt continues on Dyazide.  Pt reports pressure is better but is still having a burning sensation- felt different last Thursday- like a high sound buzzing noise - hearing is poor when noise is present-lasts for a few seconds- resolved that day- otherwise hearing is unchanged- no vertigo. Pt f/u with Dr. Tutu Lazaro for Lyme testing- was told 20% chance of having Lyme.  Taking benadryl - burning worse at night.    Allergies ? playing role  - mucus in left eye when awakening - using flonase - dyazide helps with fullness - takes in am but sx worse in evening.

## 2020-07-22 ENCOUNTER — APPOINTMENT (OUTPATIENT)
Dept: OTOLARYNGOLOGY | Facility: CLINIC | Age: 65
End: 2020-07-22
Payer: COMMERCIAL

## 2020-07-22 VITALS — DIASTOLIC BLOOD PRESSURE: 91 MMHG | HEART RATE: 67 BPM | SYSTOLIC BLOOD PRESSURE: 137 MMHG

## 2020-07-22 DIAGNOSIS — H81.03 MENIERE'S DISEASE, BILATERAL: ICD-10-CM

## 2020-07-22 PROCEDURE — 99213 OFFICE O/P EST LOW 20 MIN: CPT | Mod: 25

## 2020-07-22 PROCEDURE — 36415 COLL VENOUS BLD VENIPUNCTURE: CPT

## 2020-07-22 NOTE — HISTORY OF PRESENT ILLNESS
[de-identified] : 64M here for f/u for asymmetric SNHL and Right Meniere's- pt continues on Dyazide- took Montelukast for 2 weeks- has not been having burning sensation since- hearing feels stable- hasn’t' had flux in hearing- tinnitus is better as well as the fullness. Pt notes a "wet" feeling in both ears when waking up- resolves in 20mins after waking up- notes echoing- like hearing his own voice between 12-3pm - not daily and goes away on its own. Denies vertigo. Also no pulsatile tinnitus x several weeks -

## 2020-07-23 ENCOUNTER — TRANSCRIPTION ENCOUNTER (OUTPATIENT)
Age: 65
End: 2020-07-23

## 2020-07-24 LAB
ANION GAP SERPL CALC-SCNC: 18 MMOL/L
BUN SERPL-MCNC: 15 MG/DL
CALCIUM SERPL-MCNC: 9.8 MG/DL
CHLORIDE SERPL-SCNC: 95 MMOL/L
CO2 SERPL-SCNC: 26 MMOL/L
CREAT SERPL-MCNC: 1.03 MG/DL
GLUCOSE SERPL-MCNC: 47 MG/DL
POTASSIUM SERPL-SCNC: 3.8 MMOL/L
SODIUM SERPL-SCNC: 139 MMOL/L
TRIGL SERPL-MCNC: 75 MG/DL

## 2020-08-11 ENCOUNTER — APPOINTMENT (OUTPATIENT)
Dept: VASCULAR SURGERY | Facility: CLINIC | Age: 65
End: 2020-08-11
Payer: COMMERCIAL

## 2020-08-11 VITALS
DIASTOLIC BLOOD PRESSURE: 89 MMHG | HEART RATE: 50 BPM | OXYGEN SATURATION: 99 % | HEIGHT: 72 IN | WEIGHT: 165 LBS | SYSTOLIC BLOOD PRESSURE: 142 MMHG | TEMPERATURE: 97.3 F | BODY MASS INDEX: 22.35 KG/M2

## 2020-08-11 PROCEDURE — 93880 EXTRACRANIAL BILAT STUDY: CPT

## 2020-08-11 PROCEDURE — 99214 OFFICE O/P EST MOD 30 MIN: CPT

## 2020-08-11 RX ORDER — LISINOPRIL 5 MG/1
5 TABLET ORAL
Refills: 0 | Status: DISCONTINUED | COMMUNITY
End: 2020-08-11

## 2020-08-20 ENCOUNTER — APPOINTMENT (OUTPATIENT)
Dept: OTOLARYNGOLOGY | Facility: CLINIC | Age: 65
End: 2020-08-20
Payer: COMMERCIAL

## 2020-08-20 PROCEDURE — 92557 COMPREHENSIVE HEARING TEST: CPT

## 2020-08-20 PROCEDURE — 92567 TYMPANOMETRY: CPT

## 2020-08-28 NOTE — ED PROVIDER NOTE - CROS ED CARDIOVAS ALL NEG
[de-identified] : She has been having increasing pain in the left knee, she is taking ibuprofen without improvement, she isn't ready to see the orthopedist yet.  When she sleeps she feels pressure in the right side of her neck where the lipoma is.  Her fingernail get red bumps on them and then peel
negative...

## 2020-09-22 RX ORDER — TRIAMTERENE AND HYDROCHLOROTHIAZIDE 25; 37.5 MG/1; MG/1
37.5-25 TABLET ORAL DAILY
Qty: 60 | Refills: 0 | Status: DISCONTINUED | COMMUNITY
Start: 2020-03-13 | End: 2020-09-22

## 2020-09-23 NOTE — PATIENT PROFILE ADULT - NSPROIMPLANTSMEDDEV_GEN_A_NUR
Mother came in today with questions about pumping, flange fit, pumping at work, bottle feeding and pacifier use. Mother fed infant in office, about 9 min per side, transferred 2.2 oz, infant had recently fed within the last hour before coming. Recommended 21 mm flange for better fit and answered all of mother's questions. Advised to follow up as needed.     Lactation Consult Note    Evaluation Completed: 2020 16:26 EDT  Patient Name: Sadie Goldstein  :  1986  MRN:  [id number]     REFERRAL  INFORMATION:                          Date of Referral: 20   Person Making Referral: patient  Maternal Reason for Referral: breastfeeding currently         MATERNAL ASSESSMENT:     Breast Shape: Bilateral:, round (20 : Rosie Gray, RN)  Breast Density: Bilateral:, full (20 : Rosie Gray, RN)  Areola: Bilateral:, elastic (20 : Rosie Gray, RN)  Nipples: Bilateral:, everted (20 : Rosie Gray, RN)                MATERNAL INFANT FEEDING:     Maternal Emotional State: independent (20 : Rosie Gray, RN)  Infant Positioning: cross-cradle (20 : Rosie Gray, RN)   Signs of Milk Transfer: audible swallow, deep jaw excursions noted, breasts soften with feeding, suck/swallow ratio, transfer present (20 : Rosie Gray, RN)  Pain with Feeding: no (20 : Rosie Gray, RN)                       Latch Assistance: none needed (20 : Rosie Gray, RN)                           Feeding Readiness Cues: crying, eager, energy for feeding, finger sucking, hand to mouth movements (20 : Rosie Gray, RN)  Satiety Cues: calm after feeding, cessation of sucking (20 : Rosie Gray, RN)     Effective Latch During Feeding: yes (20 : Rosie Gray, RN)  Suck/Swallow Coordination: present (20 : Rosie Gray RN)     Prefeeding Weight  (gm): 3804 g (134.2 oz) (20 : Rosie Gray, RN)  Postfeeding Weight (gm): 3866 g (136.4 oz) (20 : Rosie Gray, RN)  Weight Gain/Loss (gm) : 62 g (2.2 oz) (20 : Rosie Gray, RN)      Latch: 2-->grasps breast, tongue down, lips flanged, rhythmic sucking (20 : Rosie Gray, RN)  Audible Swallowin-->spontaneous and intermittent (24 hrs old) (20 : Rosie Gray, RN)  Type of Nipple: 2-->everted (after stimulation) (20 : Rosie Gray, RN)  Comfort (Breast/Nipple): 2-->soft/nontender (20 : Rosie Gray, RN)  Hold (Positioning): 2-->no assist from staff, mother able to position/hold infant (20 : Rosie Gray, RN)  Latch Score: 10 (20 : Rosie Gray, RN)      EQUIPMENT TYPE:  Breast Pump Type: double electric, hospital grade (20 : Rosie Gray, RN)  Breast Pump Flange Type: hard (20 : Rosie Gray, RN)  Breast Pump Flange Size: 21 mm (20 : Rosie Gray, RN)                        BREAST PUMPING:          LACTATION REFERRALS:                          None

## 2020-10-06 ENCOUNTER — APPOINTMENT (OUTPATIENT)
Dept: ALLERGY | Facility: CLINIC | Age: 65
End: 2020-10-06
Payer: COMMERCIAL

## 2020-10-06 VITALS
BODY MASS INDEX: 22.35 KG/M2 | DIASTOLIC BLOOD PRESSURE: 81 MMHG | WEIGHT: 165 LBS | SYSTOLIC BLOOD PRESSURE: 125 MMHG | HEART RATE: 59 BPM | OXYGEN SATURATION: 98 % | HEIGHT: 72 IN

## 2020-10-06 PROCEDURE — 95018 ALL TSTG PERQ&IQ DRUGS/BIOL: CPT

## 2020-10-06 PROCEDURE — 99204 OFFICE O/P NEW MOD 45 MIN: CPT | Mod: 25

## 2020-10-06 PROCEDURE — 95004 PERQ TESTS W/ALRGNC XTRCS: CPT

## 2020-10-06 RX ORDER — METHYLPREDNISOLONE 4 MG/1
4 TABLET ORAL
Qty: 1 | Refills: 0 | Status: DISCONTINUED | COMMUNITY
Start: 2020-09-22 | End: 2020-10-06

## 2020-10-06 NOTE — IMPRESSION
[Allergy Testing Dust Mite] : dust mites [Allergy Testing Cockroach] : cockroach [Allergy Testing Dog] : dog [Allergy Testing Cat] : cat [] : molds [Allergy Testing Weeds] : weeds [Allergy Testing Trees] : trees [Allergy Testing Grasses] : grasses

## 2020-10-06 NOTE — PHYSICAL EXAM
[Alert] : alert [Well Nourished] : well nourished [Healthy Appearance] : healthy appearance [No Acute Distress] : no acute distress [Well Developed] : well developed [Normal Pupil & Iris Size/Symmetry] : normal pupil and iris size and symmetry [No Discharge] : no discharge [Sclera Not Icteric] : sclera not icteric [Normal TMs] : both tympanic membranes were normal [Normal Outer Ear/Nose] : the ears and nose were normal in appearance [No Oral Lesions or Ulcers] : no oral lesions or ulcers [No Neck Mass] : no neck mass was observed [No LAD] : no lymphadenopathy [Supple] : the neck was supple [Normal Rate and Effort] : normal respiratory rhythm and effort [No Crackles] : no crackles [No Retractions] : no retractions [Bilateral Audible Breath Sounds] : bilateral audible breath sounds [Normal Rate] : heart rate was normal  [Normal S1, S2] : normal S1 and S2 [No murmur] : no murmur [Regular Rhythm] : with a regular rhythm [Normal Cervical Lymph Nodes] : cervical [Skin Intact] : skin intact  [No Rash] : no rash [No Skin Lesions] : no skin lesions [No Joint Swelling or Erythema] : no joint swelling or erythema [No clubbing] : no clubbing [No Edema] : no edema [No Cyanosis] : no cyanosis [Normal Mood] : mood was normal [Normal Affect] : affect was normal [Alert, Awake, Oriented as Age-Appropriate] : alert, awake, oriented as age appropriate

## 2020-10-13 NOTE — PHYSICAL EXAM
[Normal Rate and Rhythm] : normal rate and rhythm [2+] : left 2+ [Ankle Swelling (On Exam)] : not present [Varicose Veins Of Lower Extremities] : not present [] : not present [No Rash or Lesion] : No rash or lesion [Alert] : alert [Oriented to Person] : oriented to person [Oriented to Place] : oriented to place [Oriented to Time] : oriented to time [Calm] : calm [de-identified] : NAD [de-identified] : EOMI, no facial edema, no ptosis or facial droop [de-identified] : supple, no masses [de-identified] : unlabored breathing [de-identified] : soft, NT, ND, no pulsatile mass [de-identified] : FROM of all 4 extremities\par

## 2020-10-13 NOTE — ASSESSMENT
[FreeTextEntry1] : 65 yo M with history of  HTN and hypercholesterolemia and basal cell carcinoma, admitted to [facility] for dizziness, loss of taste and weakness of left hand, concerning for TIA, here for follow up.\par \par carotid duplex demonstrates <50% stenosis of bilateral ICA and no evidence of dissection flap [Medication Management] : medication management

## 2020-10-13 NOTE — HISTORY OF PRESENT ILLNESS
[FreeTextEntry1] : 65 yo M with history of  HTN and hypercholesterolemia and basal cell carcinoma, admitted to [facility] for dizziness, loss of taste and weakness of left hand, concerning for TIA. Symptoms began 11/21/2019. He is here for follow up evaluation. He was diagnosed with right carotid dissection on CT angiogram, which was not visualized on carotid duplex. He experienced right neck pain and had been lifting weights and exercising in the weeks preceding onset of symptoms. Symptoms have resolved. He has never experienced a stroke or TIA in the past. He was discharged home on Eliquis and advised to follow up for repeat imaging in the office. He currently denies complaints. He saw Dr. Costa post-hospitalization, who repeated his duplex and noted there was no residual dissection flap. He stopped his Eliquis and started pt on baby aspirin. Pt stopped taking his statin, as he was having  muscle cramping of arms and legs. \par \par  [de-identified] : Denies TIA or stroke symptoms. Denies pain in the neck. He takes a baby aspirin daily, but does not take statin.

## 2020-10-14 ENCOUNTER — APPOINTMENT (OUTPATIENT)
Dept: OTOLARYNGOLOGY | Facility: CLINIC | Age: 65
End: 2020-10-14
Payer: COMMERCIAL

## 2020-10-14 VITALS — SYSTOLIC BLOOD PRESSURE: 150 MMHG | DIASTOLIC BLOOD PRESSURE: 87 MMHG | HEART RATE: 68 BPM

## 2020-10-14 DIAGNOSIS — R20.8 OTHER DISTURBANCES OF SKIN SENSATION: ICD-10-CM

## 2020-10-14 PROCEDURE — 99214 OFFICE O/P EST MOD 30 MIN: CPT

## 2020-10-14 NOTE — PHYSICAL EXAM
[] : septum deviated to the left [Normal] : mucosa is normal [Midline] : trachea located in midline position

## 2020-10-14 NOTE — HISTORY OF PRESENT ILLNESS
[de-identified] : 65M here for f/u for asymmetric SNHL and Right Meniere's- pt continues on Dyazide- had allergy eval-  positive to trees and grasses and spring pollen- to use Flonase for 2 weeks- burning is intermittent- none yesterday. Hearing feels stable- with fullness in both ears (R>L). Pt had barium swallow- with moderate GERD- the structure of stomach is narrow- to do an endoscopy. high pitched ring - also smells smoke when feels burning in ear -

## 2020-11-02 NOTE — ASSESSMENT
[FreeTextEntry1] : No evidence of underlying allergies resulting in Meneire's Disease - he does have a history of spring allergic rhinitis and I have recommended that he use Flonase 2 puffs QD on a regular basis for one month and see if that helps his ear symptoms. He will follow up with ENT for further treatment and recommendations.

## 2020-11-02 NOTE — CONSULT LETTER
[Dear  ___] : Dear ~YOLANDA, [Thank you for referring [unfilled] for consultation for _____] : Thank you for referring [unfilled] for consultation for [unfilled] [Please see my note below.] : Please see my note below. [Sincerely,] : Sincerely, [FreeTextEntry3] : Mitchell B. Boxer, M.D., FAAAAI\par [facility]\par \par Department of Allergy-Immunology\par [facility] \par [address] [place] [address]\par [place] [address]\par Tel:   [phone]\par Fax:  [phone]\par Email: [email or web address]\par

## 2020-11-02 NOTE — HISTORY OF PRESENT ILLNESS
[de-identified] : Patient with a one year history fullness in both ears - symptoms worse at night - redness of the ears - MRI of IAC normal.   He was referred to Dr. Butler - mild hearing loss - testing revealed Meneire's disease and he was treated with diuretics with improvement.  He than noted burning sensation in the ears - symptoms intermittent - he has been treated with Singulair with some improvement.  He was diagnosed with endolymphatic hydrops of both ears.    He was treated with Medrol with some improvement in his symptoms. \par \par Patient was lifting weights and he felt very dizzy and he was found to have a carotid dissection.  \par \par Patient had a carotid artery dissection - treated with Eliquis for 2 months - he is followed with carotid sonogram.

## 2020-11-02 NOTE — SOCIAL HISTORY
[Spouse/Partner] : spouse/partner [House] : [unfilled] lives in a house  [Radiator/Baseboard] : heating provided by radiator(s)/baseboard(s) [Central] : air conditioning provided by central unit [None] : none [] :  [FreeTextEntry2] : retired [employer]  [Bedroom] : not in the bedroom [Basement] : not in the basement [Living Area] : not in the living area [Smokers in Household] : there are no smokers in the home

## 2020-11-02 NOTE — REVIEW OF SYSTEMS
[Dizziness] : dizziness [Nl] : Genitourinary [FreeTextEntry7] : Volvulus with intestinal twist.  [FreeTextEntry8] : carotid dissection

## 2020-11-06 ENCOUNTER — RX RENEWAL (OUTPATIENT)
Age: 65
End: 2020-11-06

## 2020-11-06 RX ORDER — TRIAMTERENE AND HYDROCHLOROTHIAZIDE 25; 37.5 MG/1; MG/1
37.5-25 TABLET ORAL DAILY
Qty: 90 | Refills: 3 | Status: ACTIVE | COMMUNITY
Start: 2020-06-03 | End: 1900-01-01

## 2020-11-09 ENCOUNTER — TRANSCRIPTION ENCOUNTER (OUTPATIENT)
Age: 65
End: 2020-11-09

## 2020-11-18 ENCOUNTER — APPOINTMENT (OUTPATIENT)
Dept: PEDIATRIC ALLERGY IMMUNOLOGY | Facility: CLINIC | Age: 65
End: 2020-11-18

## 2020-12-28 NOTE — ED ADULT NURSE NOTE - BREATHING, MLM
Patient calls for refill of gabapentin 300mg.   She takes 1 tablet 3 per day and wants this rx sent to [facility] Spontaneous, unlabored and symmetrical

## 2021-06-11 ENCOUNTER — APPOINTMENT (OUTPATIENT)
Dept: OTOLARYNGOLOGY | Facility: CLINIC | Age: 66
End: 2021-06-11
Payer: COMMERCIAL

## 2021-06-11 VITALS
HEIGHT: 72 IN | HEART RATE: 67 BPM | WEIGHT: 165 LBS | TEMPERATURE: 98.2 F | BODY MASS INDEX: 22.35 KG/M2 | RESPIRATION RATE: 14 BRPM | DIASTOLIC BLOOD PRESSURE: 96 MMHG | SYSTOLIC BLOOD PRESSURE: 161 MMHG

## 2021-06-11 DIAGNOSIS — H93.8X1 OTHER SPECIFIED DISORDERS OF RIGHT EAR: ICD-10-CM

## 2021-06-11 DIAGNOSIS — M26.609 UNSPECIFIED TEMPOROMANDIBULAR JOINT DISORDER: ICD-10-CM

## 2021-06-11 DIAGNOSIS — H93.8X2 OTHER SPECIFIED DISORDERS OF LEFT EAR: ICD-10-CM

## 2021-06-11 DIAGNOSIS — H93.12 TINNITUS, LEFT EAR: ICD-10-CM

## 2021-06-11 DIAGNOSIS — H90.41 SENSORINEURAL HEARING LOSS, UNILATERAL, RIGHT EAR, WITH UNRESTRICTED HEARING ON THE CONTRALATERAL SIDE: ICD-10-CM

## 2021-06-11 DIAGNOSIS — H90.5 UNSPECIFIED SENSORINEURAL HEARING LOSS: ICD-10-CM

## 2021-06-11 PROCEDURE — 92567 TYMPANOMETRY: CPT

## 2021-06-11 PROCEDURE — 92557 COMPREHENSIVE HEARING TEST: CPT

## 2021-06-11 PROCEDURE — 99214 OFFICE O/P EST MOD 30 MIN: CPT | Mod: 25

## 2021-06-12 PROBLEM — H93.12 LEFT-SIDED TINNITUS: Status: ACTIVE | Noted: 2021-06-12

## 2021-06-12 PROBLEM — H90.5 ASYMMETRIC SNHL (SENSORINEURAL HEARING LOSS): Status: ACTIVE | Noted: 2019-12-31

## 2021-06-12 PROBLEM — H93.8X2 EAR FULLNESS, LEFT: Status: ACTIVE | Noted: 2021-06-12

## 2021-06-12 PROBLEM — H93.8X1 SENSATION OF PLUGGED EAR ON RIGHT SIDE: Status: ACTIVE | Noted: 2020-06-03

## 2021-06-12 PROBLEM — H90.41 SENSORINEURAL HEARING LOSS (SNHL) OF RIGHT EAR WITH UNRESTRICTED HEARING OF LEFT EAR: Status: ACTIVE | Noted: 2021-06-12

## 2021-06-12 PROBLEM — M26.609 TMJ DYSFUNCTION: Status: ACTIVE | Noted: 2021-06-12

## 2021-06-12 NOTE — REASON FOR VISIT
[Initial Consultation] : an initial consultation for [FreeTextEntry2] : Right ear burning, left fluctuating hearing loss

## 2021-06-12 NOTE — DATA REVIEWED
[de-identified] : I personally reviewed the patient's audiogram, which shows\par stable hearing loss 1.5-2k AU

## 2021-06-12 NOTE — HISTORY OF PRESENT ILLNESS
[de-identified] : 65M presents for Left ear fluctuating hearing loss.\par last episode was April, prior to that was 2019.  First episode with associated ear fullness\par had been diagnosed with MD by Dr. Butler and was started on Dyazide at that time\par stopped the dyazide back in Nov. 2020\par Pt. also with burning on the Right ear, that usually comes on at night and last for 2-3 hours\par feels like acid in the ear, when burning stops the ear feels numb\par denies ear infections or surgery on the ears\par had MRI's done

## 2021-07-26 ENCOUNTER — APPOINTMENT (OUTPATIENT)
Dept: OTOLARYNGOLOGY | Facility: CLINIC | Age: 66
End: 2021-07-26

## 2021-08-26 ENCOUNTER — APPOINTMENT (OUTPATIENT)
Dept: VASCULAR SURGERY | Facility: CLINIC | Age: 66
End: 2021-08-26
Payer: SELF-PAY

## 2021-08-26 VITALS
DIASTOLIC BLOOD PRESSURE: 87 MMHG | WEIGHT: 165 LBS | BODY MASS INDEX: 22.35 KG/M2 | OXYGEN SATURATION: 98 % | SYSTOLIC BLOOD PRESSURE: 142 MMHG | HEART RATE: 56 BPM | HEIGHT: 72 IN

## 2021-08-26 DIAGNOSIS — I77.71 DISSECTION OF CAROTID ARTERY: ICD-10-CM

## 2021-08-26 PROCEDURE — 99214 OFFICE O/P EST MOD 30 MIN: CPT

## 2021-08-26 NOTE — ASSESSMENT
[FreeTextEntry1] : 63 yo M with history of  HTN and hypercholesterolemia and basal cell carcinoma, admitted to [facility] for dizziness, loss of taste and weakness of left hand, concerning for TIA, here for follow up.\par \par carotid duplex demonstrates <50% stenosis of bilateral ICA and no evidence of dissection flap [Medication Management] : medication management

## 2021-08-26 NOTE — PHYSICAL EXAM
[Normal Rate and Rhythm] : normal rate and rhythm [2+] : left 2+ [Ankle Swelling (On Exam)] : not present [Varicose Veins Of Lower Extremities] : not present [] : not present [No Rash or Lesion] : No rash or lesion [Alert] : alert [Oriented to Person] : oriented to person [Oriented to Place] : oriented to place [Oriented to Time] : oriented to time [Calm] : calm [de-identified] : NAD [de-identified] : EOMI, no facial edema, no ptosis or facial droop [de-identified] : supple, no masses [de-identified] : unlabored breathing [de-identified] : soft, NT, ND, no pulsatile mass [de-identified] : FROM of all 4 extremities\par

## 2021-08-26 NOTE — HISTORY OF PRESENT ILLNESS
[FreeTextEntry1] : 63 yo M with history of  HTN and hypercholesterolemia and basal cell carcinoma, admitted to [facility] for dizziness, loss of taste and weakness of left hand, concerning for TIA. Symptoms began 11/21/2019. He is here for follow up evaluation. He was diagnosed with right carotid dissection on CT angiogram, which was not visualized on carotid duplex. He experienced right neck pain and had been lifting weights and exercising in the weeks preceding onset of symptoms. Symptoms have resolved. He has never experienced a stroke or TIA in the past. He was discharged home on Eliquis and advised to follow up for repeat imaging in the office. He currently denies complaints. He saw Dr. Costa post-hospitalization, who repeated his duplex and noted there was no residual dissection flap. He stopped his Eliquis and started pt on baby aspirin. Pt stopped taking his statin, as he was having  muscle cramping of arms and legs. \par \par  [de-identified] : Denies TIA or stroke symptoms. Denies pain in the neck. He takes a baby aspirin daily, but does not take statin.

## 2021-09-02 NOTE — ED ADULT TRIAGE NOTE - NS ED NURSE BANDS TYPE
Dr Nassar called, gave verbal orders.  See new orders.       Radha Davila RN  09/02/21 [id number]     Name band;

## 2021-09-28 NOTE — ED ADULT TRIAGE NOTE - BMI (KG/M2)
Patient sees Dr Salomón Bobo from [address] [place] [address] [facility] is calling to request a referral for PT faxed to [phone]  t is needed because of the patients insurance  24.4

## 2023-01-24 NOTE — ED ADULT NURSE NOTE - CHPI ED NUR QUALITY2
"Received a call from the call center to discuss a 12 pound weight gain.  Spoke to Amelia who says she has had a 12 pound weight gain in 6 days. She says her furosemide dose was decreased from 60 mg BID to 40 mg BID due to her kidneys not working and it is just not working for her and she cannot live like this at home. She says her feet and legs are more swollen than ever and she is panting.   Advised it is recommended to go to ED for an evaluation.   She verbalized agreement and understanding and plans to go to the [facility].  1. ONSET: \"When did the swelling start?\" (e.g., minutes, hours, days)  2. LOCATION: \"What part of the leg is swollen?\" \"Are both legs swollen or just one leg?\" both  3. SEVERITY: \"How bad is the swelling?\" (e.g., localized; mild, moderate, severe) moderate to severe  - Localized - small area of swelling localized to one leg  - MILD pedal edema - swelling limited to foot and ankle, pitting edema < 1/4 inch (6 mm) deep, rest and elevation eliminate most or all swelling  - MODERATE edema - swelling of lower leg to knee, pitting edema > 1/4 inch (6 mm) deep, rest and elevation only partially reduce swelling  - SEVERE edema - swelling extends above knee, facial or hand swelling present   4. REDNESS: \"Does the swelling look red or infected?\"  5. PAIN: \"Is the swelling painful to touch?\" If Yes, ask: \"How painful is it?\" (Scale 1-10; mild, moderate or severe)  6. FEVER: \"Do you have a fever?\" If Yes, ask: \"What is it, how was it measured, and when did it start?\"   7. CAUSE: \"What do you think is causing the leg swelling?\" fluid retention  8. MEDICAL HISTORY: \"Do you have a history of heart failure, kidney disease, liver failure, or cancer?\" chronic CHF  9. RECURRENT SYMPTOM: \"Have you had leg swelling before?\" If Yes, ask: \"When was the last time?\" \"What happened that time?\"  10. OTHER SYMPTOMS: \"Do you have any other symptoms?\" (e.g., chest pain, difficulty breathing) breathing difficulty      Reason " for Disposition    SEVERE swelling (e.g., swelling extends above knee, entire leg is swollen, weeping fluid)    Difficulty breathing at rest    Protocols used: LEG SWELLING AND EDEMA-A-OH       sharp

## 2023-05-03 NOTE — ED PROVIDER NOTE - CONSTITUTIONAL, MLM
normal... Well appearing, awake, alert, oriented to person, place, time/situation and appears very uncomfortable Adult

## 2024-03-10 NOTE — ED PROVIDER NOTE - RECENT EXPOSURE TO
The patient's goals for the shift include safety pain management    The clinical goals for the shift include comfort      Problem: Pain  Goal: My pain/discomfort is manageable  Outcome: Progressing     Problem: Safety  Goal: Patient will be injury free during hospitalization  Outcome: Progressing  Goal: I will remain free of falls  Outcome: Progressing     Problem: Daily Care  Goal: Daily care needs are met  Outcome: Progressing     Problem: Psychosocial Needs  Goal: Demonstrates ability to cope with hospitalization/illness  Outcome: Progressing  Goal: Collaborate with me, my family, and caregiver to identify my specific goals  Outcome: Progressing     Problem: Bathing  Goal: LTG - Patient will utilize adaptive techniques to bathe body sba  Outcome: Progressing     Problem: Dressings Lower Extremities  Goal: LTG - Patient will dress lower body/hip brace min a  Outcome: Progressing     Problem: Dressing Upper Extremities  Goal: LTG - Patient will complete upper body dressing mod I  Outcome: Progressing     Problem: Grooming  Goal: LTG - Patient will complete daily grooming tasks indep  Outcome: Progressing     Problem: Instrumental Activities of Daily Living  Goal: LTG - Patient will complete simple kitchen access/meal preparation activities supervision via melissa  Outcome: Progressing        none known

## 2024-12-09 ENCOUNTER — APPOINTMENT (OUTPATIENT)
Dept: VASCULAR SURGERY | Facility: CLINIC | Age: 69
End: 2024-12-09
Payer: COMMERCIAL

## 2024-12-09 VITALS
DIASTOLIC BLOOD PRESSURE: 80 MMHG | HEIGHT: 72 IN | BODY MASS INDEX: 23.7 KG/M2 | TEMPERATURE: 96.7 F | WEIGHT: 175 LBS | SYSTOLIC BLOOD PRESSURE: 138 MMHG

## 2024-12-09 DIAGNOSIS — I77.71 DISSECTION OF CAROTID ARTERY: ICD-10-CM

## 2024-12-09 PROCEDURE — 93880 EXTRACRANIAL BILAT STUDY: CPT

## 2024-12-09 PROCEDURE — 99204 OFFICE O/P NEW MOD 45 MIN: CPT

## 2024-12-18 NOTE — ED ADULT NURSE NOTE - GASTROINTESTINAL ASSESSMENT
What Type Of Note Output Would You Prefer (Optional)?: Standard Output
Hpi Title: Evaluation of a Skin Lesion
How Severe Are Your Spot(S)?: moderate
Have Your Spot(S) Been Treated In The Past?: has not been treated
blood found on sheets
WDL

## 2025-01-09 ENCOUNTER — APPOINTMENT (OUTPATIENT)
Dept: OTOLARYNGOLOGY | Facility: CLINIC | Age: 70
End: 2025-01-09

## 2025-04-28 ENCOUNTER — NON-APPOINTMENT (OUTPATIENT)
Age: 70
End: 2025-04-28

## 2025-04-29 ENCOUNTER — APPOINTMENT (OUTPATIENT)
Dept: VASCULAR SURGERY | Facility: CLINIC | Age: 70
End: 2025-04-29

## 2025-04-29 VITALS
BODY MASS INDEX: 23.7 KG/M2 | WEIGHT: 175 LBS | TEMPERATURE: 98.2 F | DIASTOLIC BLOOD PRESSURE: 94 MMHG | HEART RATE: 68 BPM | HEIGHT: 72 IN | SYSTOLIC BLOOD PRESSURE: 161 MMHG

## 2025-04-29 DIAGNOSIS — I72.0 ANEURYSM OF CAROTID ARTERY: ICD-10-CM
